# Patient Record
Sex: FEMALE | Race: WHITE | Employment: FULL TIME | URBAN - METROPOLITAN AREA
[De-identification: names, ages, dates, MRNs, and addresses within clinical notes are randomized per-mention and may not be internally consistent; named-entity substitution may affect disease eponyms.]

---

## 2018-06-19 PROBLEM — N31.9 NEUROGENIC BLADDER: Status: ACTIVE | Noted: 2018-06-19

## 2018-06-19 PROBLEM — E66.01 SEVERE OBESITY (BMI 35.0-39.9): Status: ACTIVE | Noted: 2018-06-19

## 2018-08-27 ENCOUNTER — HOSPITAL ENCOUNTER (OUTPATIENT)
Dept: MAMMOGRAPHY | Age: 48
Discharge: HOME | End: 2018-08-27
Attending: FAMILY MEDICINE
Payer: COMMERCIAL

## 2018-08-27 DIAGNOSIS — Z12.39 SCREENING FOR BREAST CANCER: ICD-10-CM

## 2018-08-27 PROCEDURE — 77067 SCR MAMMO BI INCL CAD: CPT

## 2018-10-03 ENCOUNTER — HOSPITAL ENCOUNTER (OUTPATIENT)
Dept: SURGERY | Age: 48
Discharge: HOME | End: 2018-10-03

## 2018-10-05 VITALS — BODY MASS INDEX: 39.65 KG/M2 | HEIGHT: 61 IN | WEIGHT: 210 LBS

## 2018-10-05 NOTE — PERIOP NOTES
Patient verified name and . Order for consent not found in EMR. Type 2 surgery, PAT phone assessment complete. Orders not received. Labs per surgeon: no orders  Labs per anesthesia protocol: Hgb- Pt instructed to come for lab work entrance B (Monday- Friday 8:30 AM- 4:00 PM) prior to surgery day. Pt voiced an understanding. No orders in EMR. Office is currently closed. Will have charge nurse follow up with office on Monday to notify of the need for orders in EMR due to faxed orders not being accepted as of 10/1/18. Patient answered medical/surgical history questions at their best of ability. All prior to admission medications documented in [facility]. Patient instructed to take the following medications the day of surgery according to anesthesia guidelines with a small sip of water: none. Hold all vitamins 7 days prior to surgery and NSAIDS 5 days prior to surgery. Medications to be held- none. Patient instructed on the following:  Arrive at A Entrance, time of arrival to be called the day before by 1700  NPO after midnight including gum, mints, and ice chips  Responsible adult must drive patient to the hospital, stay during surgery, and patient will  need supervision 24 hours after anesthesia  Use antibacterial soap in shower the night before surgery and on the morning of surgery  Leave all valuables (money and jewelry) at home but bring insurance card and ID on  DOS  Do not wear make-up, nail polish, lotions, cologne, perfumes, powders, or oil on skin. Patient teach back successful and patient demonstrates knowledge of instruction.

## 2018-10-08 ENCOUNTER — HOSPITAL ENCOUNTER (OUTPATIENT)
Dept: LAB | Age: 48
Discharge: HOME | End: 2018-10-08
Attending: PLASTIC SURGERY
Payer: COMMERCIAL

## 2018-10-08 LAB — HGB BLD-MCNC: 12.9 G/DL (ref 11.7–15.4)

## 2018-10-08 PROCEDURE — 36415 COLL VENOUS BLD VENIPUNCTURE: CPT

## 2018-10-08 PROCEDURE — 85018 HEMOGLOBIN: CPT

## 2018-10-08 NOTE — PERIOP NOTES
Lab results within limits per anesthesia protocol; OK for surgery. Recent Results (from the past 12 hour(s)) HEMOGLOBIN Collection Time: 10/08/18  2:40 PM  
Result Value Ref Range HGB 12.9 11.7 - 15.4 g/dL

## 2018-10-09 ENCOUNTER — ANESTHESIA EVENT (OUTPATIENT)
Dept: SURGERY | Age: 48
End: 2018-10-09
Payer: COMMERCIAL

## 2018-10-10 ENCOUNTER — HOSPITAL ENCOUNTER (OUTPATIENT)
Age: 48
Setting detail: OUTPATIENT SURGERY
Discharge: HOME | End: 2018-10-10
Attending: PLASTIC SURGERY | Admitting: PLASTIC SURGERY
Payer: COMMERCIAL

## 2018-10-10 ENCOUNTER — ANESTHESIA (OUTPATIENT)
Dept: SURGERY | Age: 48
End: 2018-10-10
Payer: COMMERCIAL

## 2018-10-10 VITALS
DIASTOLIC BLOOD PRESSURE: 67 MMHG | OXYGEN SATURATION: 91 % | HEART RATE: 125 BPM | TEMPERATURE: 98.2 F | WEIGHT: 212.8 LBS | SYSTOLIC BLOOD PRESSURE: 115 MMHG | BODY MASS INDEX: 40.21 KG/M2 | RESPIRATION RATE: 16 BRPM

## 2018-10-10 PROCEDURE — 77030002966 HC SUT PDS J&J -A: Performed by: PLASTIC SURGERY

## 2018-10-10 PROCEDURE — 74011000250 HC RX REV CODE- 250

## 2018-10-10 PROCEDURE — 76060000038 HC ANESTHESIA 3.5 TO 4 HR: Performed by: PLASTIC SURGERY

## 2018-10-10 PROCEDURE — 74011250636 HC RX REV CODE- 250/636

## 2018-10-10 PROCEDURE — 77030005537 HC CATH URETH BARD -A: Performed by: PLASTIC SURGERY

## 2018-10-10 PROCEDURE — 77030009845 HC ONTMNT BACITRCN PATT -A: Performed by: PLASTIC SURGERY

## 2018-10-10 PROCEDURE — 74011250636 HC RX REV CODE- 250/636: Performed by: ANESTHESIOLOGY

## 2018-10-10 PROCEDURE — 77030008467 HC STPLR SKN COVD -B: Performed by: PLASTIC SURGERY

## 2018-10-10 PROCEDURE — 77030018836 HC SOL IRR NACL ICUM -A: Performed by: PLASTIC SURGERY

## 2018-10-10 PROCEDURE — 88307 TISSUE EXAM BY PATHOLOGIST: CPT

## 2018-10-10 PROCEDURE — 74011250636 HC RX REV CODE- 250/636: Performed by: PLASTIC SURGERY

## 2018-10-10 PROCEDURE — 77030020782 HC GWN BAIR PAWS FLX 3M -B: Performed by: ANESTHESIOLOGY

## 2018-10-10 PROCEDURE — 77030008703 HC TU ET UNCUF COVD -A: Performed by: ANESTHESIOLOGY

## 2018-10-10 PROCEDURE — 77030008477 HC STYL SATN SLP COVD -A: Performed by: ANESTHESIOLOGY

## 2018-10-10 PROCEDURE — 74011000250 HC RX REV CODE- 250: Performed by: PLASTIC SURGERY

## 2018-10-10 PROCEDURE — 77030034849: Performed by: PLASTIC SURGERY

## 2018-10-10 PROCEDURE — 77030032490 HC SLV COMPR SCD KNE COVD -B: Performed by: PLASTIC SURGERY

## 2018-10-10 PROCEDURE — 76210000000 HC OR PH I REC 2 TO 2.5 HR: Performed by: PLASTIC SURGERY

## 2018-10-10 PROCEDURE — 77030031139 HC SUT VCRL2 J&J -A: Performed by: PLASTIC SURGERY

## 2018-10-10 PROCEDURE — 77030011825 HC SUPP SURG PSTOP S2SG -B: Performed by: PLASTIC SURGERY

## 2018-10-10 PROCEDURE — 76010000174 HC OR TIME 3.5 TO 4 HR INTENSV-TIER 1: Performed by: PLASTIC SURGERY

## 2018-10-10 PROCEDURE — 77030011266 HC ELECTRD BLD INSL COVD -A: Performed by: PLASTIC SURGERY

## 2018-10-10 PROCEDURE — 77030002933 HC SUT MCRYL J&J -A: Performed by: PLASTIC SURGERY

## 2018-10-10 PROCEDURE — 77030039425 HC BLD LARYNG TRULITE DISP TELE -A: Performed by: ANESTHESIOLOGY

## 2018-10-10 RX ORDER — FENTANYL CITRATE 50 UG/ML
INJECTION, SOLUTION INTRAMUSCULAR; INTRAVENOUS AS NEEDED
Status: DISCONTINUED | OUTPATIENT
Start: 2018-10-10 | End: 2018-10-10 | Stop reason: HOSPADM

## 2018-10-10 RX ORDER — KETOROLAC TROMETHAMINE 30 MG/ML
INJECTION, SOLUTION INTRAMUSCULAR; INTRAVENOUS AS NEEDED
Status: DISCONTINUED | OUTPATIENT
Start: 2018-10-10 | End: 2018-10-10 | Stop reason: HOSPADM

## 2018-10-10 RX ORDER — NEOSTIGMINE METHYLSULFATE 1 MG/ML
INJECTION INTRAVENOUS AS NEEDED
Status: DISCONTINUED | OUTPATIENT
Start: 2018-10-10 | End: 2018-10-10 | Stop reason: HOSPADM

## 2018-10-10 RX ORDER — ACETAMINOPHEN 10 MG/ML
INJECTION, SOLUTION INTRAVENOUS AS NEEDED
Status: DISCONTINUED | OUTPATIENT
Start: 2018-10-10 | End: 2018-10-10 | Stop reason: HOSPADM

## 2018-10-10 RX ORDER — DIPHENHYDRAMINE HYDROCHLORIDE 50 MG/ML
12.5 INJECTION, SOLUTION INTRAMUSCULAR; INTRAVENOUS
Status: DISCONTINUED | OUTPATIENT
Start: 2018-10-10 | End: 2018-10-10 | Stop reason: HOSPADM

## 2018-10-10 RX ORDER — LIDOCAINE HYDROCHLORIDE 20 MG/ML
INJECTION, SOLUTION EPIDURAL; INFILTRATION; INTRACAUDAL; PERINEURAL AS NEEDED
Status: DISCONTINUED | OUTPATIENT
Start: 2018-10-10 | End: 2018-10-10 | Stop reason: HOSPADM

## 2018-10-10 RX ORDER — FLUMAZENIL 0.1 MG/ML
0.2 INJECTION INTRAVENOUS
Status: DISCONTINUED | OUTPATIENT
Start: 2018-10-10 | End: 2018-10-10 | Stop reason: HOSPADM

## 2018-10-10 RX ORDER — GLYCOPYRROLATE 0.2 MG/ML
INJECTION INTRAMUSCULAR; INTRAVENOUS AS NEEDED
Status: DISCONTINUED | OUTPATIENT
Start: 2018-10-10 | End: 2018-10-10 | Stop reason: HOSPADM

## 2018-10-10 RX ORDER — SODIUM CHLORIDE 0.9 % (FLUSH) 0.9 %
5-10 SYRINGE (ML) INJECTION AS NEEDED
Status: DISCONTINUED | OUTPATIENT
Start: 2018-10-10 | End: 2018-10-10 | Stop reason: HOSPADM

## 2018-10-10 RX ORDER — EPHEDRINE SULFATE 50 MG/ML
INJECTION, SOLUTION INTRAVENOUS AS NEEDED
Status: DISCONTINUED | OUTPATIENT
Start: 2018-10-10 | End: 2018-10-10 | Stop reason: HOSPADM

## 2018-10-10 RX ORDER — MIDAZOLAM HYDROCHLORIDE 1 MG/ML
2 INJECTION, SOLUTION INTRAMUSCULAR; INTRAVENOUS
Status: COMPLETED | OUTPATIENT
Start: 2018-10-10 | End: 2018-10-10

## 2018-10-10 RX ORDER — ONDANSETRON 8 MG/1
8 TABLET, ORALLY DISINTEGRATING ORAL
COMMUNITY

## 2018-10-10 RX ORDER — DEXAMETHASONE SODIUM PHOSPHATE 4 MG/ML
INJECTION, SOLUTION INTRA-ARTICULAR; INTRALESIONAL; INTRAMUSCULAR; INTRAVENOUS; SOFT TISSUE AS NEEDED
Status: DISCONTINUED | OUTPATIENT
Start: 2018-10-10 | End: 2018-10-10 | Stop reason: HOSPADM

## 2018-10-10 RX ORDER — OXYCODONE HYDROCHLORIDE 5 MG/1
5 TABLET ORAL
Status: DISCONTINUED | OUTPATIENT
Start: 2018-10-10 | End: 2018-10-10 | Stop reason: HOSPADM

## 2018-10-10 RX ORDER — HYDROMORPHONE HYDROCHLORIDE 2 MG/ML
0.5 INJECTION, SOLUTION INTRAMUSCULAR; INTRAVENOUS; SUBCUTANEOUS
Status: DISCONTINUED | OUTPATIENT
Start: 2018-10-10 | End: 2018-10-10 | Stop reason: HOSPADM

## 2018-10-10 RX ORDER — DIPHENHYDRAMINE HYDROCHLORIDE 50 MG/ML
INJECTION, SOLUTION INTRAMUSCULAR; INTRAVENOUS AS NEEDED
Status: DISCONTINUED | OUTPATIENT
Start: 2018-10-10 | End: 2018-10-10 | Stop reason: HOSPADM

## 2018-10-10 RX ORDER — HYDROMORPHONE HYDROCHLORIDE 2 MG/ML
INJECTION, SOLUTION INTRAMUSCULAR; INTRAVENOUS; SUBCUTANEOUS AS NEEDED
Status: DISCONTINUED | OUTPATIENT
Start: 2018-10-10 | End: 2018-10-10 | Stop reason: HOSPADM

## 2018-10-10 RX ORDER — CEFAZOLIN SODIUM/WATER 2 G/20 ML
2 SYRINGE (ML) INTRAVENOUS ONCE
Status: COMPLETED | OUTPATIENT
Start: 2018-10-10 | End: 2018-10-10

## 2018-10-10 RX ORDER — SODIUM CHLORIDE 0.9 % (FLUSH) 0.9 %
5-10 SYRINGE (ML) INJECTION EVERY 8 HOURS
Status: DISCONTINUED | OUTPATIENT
Start: 2018-10-10 | End: 2018-10-10 | Stop reason: HOSPADM

## 2018-10-10 RX ORDER — LIDOCAINE HYDROCHLORIDE 10 MG/ML
0.1 INJECTION INFILTRATION; PERINEURAL AS NEEDED
Status: DISCONTINUED | OUTPATIENT
Start: 2018-10-10 | End: 2018-10-10 | Stop reason: HOSPADM

## 2018-10-10 RX ORDER — PROPOFOL 10 MG/ML
INJECTION, EMULSION INTRAVENOUS AS NEEDED
Status: DISCONTINUED | OUTPATIENT
Start: 2018-10-10 | End: 2018-10-10 | Stop reason: HOSPADM

## 2018-10-10 RX ORDER — HEPARIN SODIUM 5000 [USP'U]/ML
5000 INJECTION, SOLUTION INTRAVENOUS; SUBCUTANEOUS ONCE
Status: COMPLETED | OUTPATIENT
Start: 2018-10-10 | End: 2018-10-10

## 2018-10-10 RX ORDER — BUPIVACAINE HYDROCHLORIDE 5 MG/ML
INJECTION, SOLUTION EPIDURAL; INTRACAUDAL AS NEEDED
Status: DISCONTINUED | OUTPATIENT
Start: 2018-10-10 | End: 2018-10-10 | Stop reason: HOSPADM

## 2018-10-10 RX ORDER — SODIUM CHLORIDE, SODIUM LACTATE, POTASSIUM CHLORIDE, CALCIUM CHLORIDE 600; 310; 30; 20 MG/100ML; MG/100ML; MG/100ML; MG/100ML
100 INJECTION, SOLUTION INTRAVENOUS CONTINUOUS
Status: DISCONTINUED | OUTPATIENT
Start: 2018-10-10 | End: 2018-10-10 | Stop reason: HOSPADM

## 2018-10-10 RX ORDER — SODIUM CHLORIDE, SODIUM LACTATE, POTASSIUM CHLORIDE, CALCIUM CHLORIDE 600; 310; 30; 20 MG/100ML; MG/100ML; MG/100ML; MG/100ML
INJECTION, SOLUTION INTRAVENOUS
Status: DISCONTINUED | OUTPATIENT
Start: 2018-10-10 | End: 2018-10-10 | Stop reason: HOSPADM

## 2018-10-10 RX ORDER — ROCURONIUM BROMIDE 10 MG/ML
INJECTION, SOLUTION INTRAVENOUS AS NEEDED
Status: DISCONTINUED | OUTPATIENT
Start: 2018-10-10 | End: 2018-10-10 | Stop reason: HOSPADM

## 2018-10-10 RX ORDER — SCOLOPAMINE TRANSDERMAL SYSTEM 1 MG/1
1 PATCH, EXTENDED RELEASE TRANSDERMAL
COMMUNITY
End: 2018-11-26

## 2018-10-10 RX ORDER — NALOXONE HYDROCHLORIDE 0.4 MG/ML
0.1 INJECTION, SOLUTION INTRAMUSCULAR; INTRAVENOUS; SUBCUTANEOUS
Status: DISCONTINUED | OUTPATIENT
Start: 2018-10-10 | End: 2018-10-10 | Stop reason: HOSPADM

## 2018-10-10 RX ORDER — SODIUM CHLORIDE, SODIUM LACTATE, POTASSIUM CHLORIDE, CALCIUM CHLORIDE 600; 310; 30; 20 MG/100ML; MG/100ML; MG/100ML; MG/100ML
75 INJECTION, SOLUTION INTRAVENOUS CONTINUOUS
Status: DISCONTINUED | OUTPATIENT
Start: 2018-10-10 | End: 2018-10-10 | Stop reason: HOSPADM

## 2018-10-10 RX ORDER — ONDANSETRON 2 MG/ML
INJECTION INTRAMUSCULAR; INTRAVENOUS AS NEEDED
Status: DISCONTINUED | OUTPATIENT
Start: 2018-10-10 | End: 2018-10-10 | Stop reason: HOSPADM

## 2018-10-10 RX ADMIN — ACETAMINOPHEN 1000 MG: 10 INJECTION, SOLUTION INTRAVENOUS at 12:41

## 2018-10-10 RX ADMIN — SODIUM CHLORIDE, SODIUM LACTATE, POTASSIUM CHLORIDE, AND CALCIUM CHLORIDE 100 ML/HR: 600; 310; 30; 20 INJECTION, SOLUTION INTRAVENOUS at 12:04

## 2018-10-10 RX ADMIN — HYDROMORPHONE HYDROCHLORIDE 0.5 MG: 2 INJECTION, SOLUTION INTRAMUSCULAR; INTRAVENOUS; SUBCUTANEOUS at 16:40

## 2018-10-10 RX ADMIN — ROCURONIUM BROMIDE 10 MG: 10 INJECTION, SOLUTION INTRAVENOUS at 13:42

## 2018-10-10 RX ADMIN — FENTANYL CITRATE 50 MCG: 50 INJECTION, SOLUTION INTRAMUSCULAR; INTRAVENOUS at 12:26

## 2018-10-10 RX ADMIN — DIPHENHYDRAMINE HYDROCHLORIDE 25 MG: 50 INJECTION, SOLUTION INTRAMUSCULAR; INTRAVENOUS at 12:37

## 2018-10-10 RX ADMIN — EPHEDRINE SULFATE 10 MG: 50 INJECTION, SOLUTION INTRAVENOUS at 14:51

## 2018-10-10 RX ADMIN — HYDROMORPHONE HYDROCHLORIDE 0.5 MG: 2 INJECTION, SOLUTION INTRAMUSCULAR; INTRAVENOUS; SUBCUTANEOUS at 15:55

## 2018-10-10 RX ADMIN — SODIUM CHLORIDE, SODIUM LACTATE, POTASSIUM CHLORIDE, CALCIUM CHLORIDE: 600; 310; 30; 20 INJECTION, SOLUTION INTRAVENOUS at 14:15

## 2018-10-10 RX ADMIN — EPHEDRINE SULFATE 10 MG: 50 INJECTION, SOLUTION INTRAVENOUS at 13:19

## 2018-10-10 RX ADMIN — HEPARIN SODIUM 5000 UNITS: 5000 INJECTION INTRAVENOUS; SUBCUTANEOUS at 12:04

## 2018-10-10 RX ADMIN — KETOROLAC TROMETHAMINE 30 MG: 30 INJECTION, SOLUTION INTRAMUSCULAR; INTRAVENOUS at 15:29

## 2018-10-10 RX ADMIN — EPHEDRINE SULFATE 10 MG: 50 INJECTION, SOLUTION INTRAVENOUS at 14:16

## 2018-10-10 RX ADMIN — PROPOFOL 200 MG: 10 INJECTION, EMULSION INTRAVENOUS at 12:20

## 2018-10-10 RX ADMIN — NEOSTIGMINE METHYLSULFATE 3 MG: 1 INJECTION INTRAVENOUS at 15:33

## 2018-10-10 RX ADMIN — ONDANSETRON 4 MG: 2 INJECTION INTRAMUSCULAR; INTRAVENOUS at 14:53

## 2018-10-10 RX ADMIN — GLYCOPYRROLATE 0.4 MG: 0.2 INJECTION INTRAMUSCULAR; INTRAVENOUS at 15:33

## 2018-10-10 RX ADMIN — FENTANYL CITRATE 50 MCG: 50 INJECTION, SOLUTION INTRAMUSCULAR; INTRAVENOUS at 12:20

## 2018-10-10 RX ADMIN — LIDOCAINE HYDROCHLORIDE 60 MG: 20 INJECTION, SOLUTION EPIDURAL; INFILTRATION; INTRACAUDAL; PERINEURAL at 12:20

## 2018-10-10 RX ADMIN — FENTANYL CITRATE 25 MCG: 50 INJECTION, SOLUTION INTRAMUSCULAR; INTRAVENOUS at 14:38

## 2018-10-10 RX ADMIN — DEXAMETHASONE SODIUM PHOSPHATE 10 MG: 4 INJECTION, SOLUTION INTRA-ARTICULAR; INTRALESIONAL; INTRAMUSCULAR; INTRAVENOUS; SOFT TISSUE at 12:34

## 2018-10-10 RX ADMIN — SODIUM CHLORIDE, SODIUM LACTATE, POTASSIUM CHLORIDE, AND CALCIUM CHLORIDE: 600; 310; 30; 20 INJECTION, SOLUTION INTRAVENOUS at 12:12

## 2018-10-10 RX ADMIN — ROCURONIUM BROMIDE 10 MG: 10 INJECTION, SOLUTION INTRAVENOUS at 14:31

## 2018-10-10 RX ADMIN — SODIUM CHLORIDE, SODIUM LACTATE, POTASSIUM CHLORIDE, CALCIUM CHLORIDE: 600; 310; 30; 20 INJECTION, SOLUTION INTRAVENOUS at 12:51

## 2018-10-10 RX ADMIN — FENTANYL CITRATE 50 MCG: 50 INJECTION, SOLUTION INTRAMUSCULAR; INTRAVENOUS at 13:10

## 2018-10-10 RX ADMIN — ROCURONIUM BROMIDE 40 MG: 10 INJECTION, SOLUTION INTRAVENOUS at 12:20

## 2018-10-10 RX ADMIN — SODIUM CHLORIDE, SODIUM LACTATE, POTASSIUM CHLORIDE, AND CALCIUM CHLORIDE 100 ML/HR: 600; 310; 30; 20 INJECTION, SOLUTION INTRAVENOUS at 11:00

## 2018-10-10 RX ADMIN — HYDROMORPHONE HYDROCHLORIDE 0.5 MG: 2 INJECTION, SOLUTION INTRAMUSCULAR; INTRAVENOUS; SUBCUTANEOUS at 16:45

## 2018-10-10 RX ADMIN — SODIUM CHLORIDE, SODIUM LACTATE, POTASSIUM CHLORIDE, CALCIUM CHLORIDE: 600; 310; 30; 20 INJECTION, SOLUTION INTRAVENOUS at 12:12

## 2018-10-10 RX ADMIN — MIDAZOLAM HYDROCHLORIDE 2 MG: 1 INJECTION, SOLUTION INTRAMUSCULAR; INTRAVENOUS at 12:05

## 2018-10-10 RX ADMIN — Medication 2 G: at 12:24

## 2018-10-10 RX ADMIN — FENTANYL CITRATE 25 MCG: 50 INJECTION, SOLUTION INTRAMUSCULAR; INTRAVENOUS at 15:20

## 2018-10-10 NOTE — BRIEF OP NOTE
BRIEF OPERATIVE NOTE Date of Procedure: 10/10/2018 Preoperative Diagnosis: Breast hypertrophy [N62] Breast pain [N64.4] Acute right-sided low back pain with sciatica, sciatica laterality unspecified [M54.40] Disorder of neck [M53.82] Intertrigo [L30.4] Ptosis of breast [N64.81] Postoperative Diagnosis: Breast hypertrophy [N62] Breast pain [N64.4] Acute right-sided low back pain with sciatica, sciatica laterality unspecified [M54.40] Disorder of neck [M53.82] Intertrigo [L30.4] Ptosis of breast [N64.81] Procedure(s): BILATERAL BREAST REDUCTION Surgeon(s) and Role: Meagan Carrillo MD - Primary Surgical Assistant: Maris Meckel, cst 
 
Surgical Staff: 
Circ-1: Radhames Harrell RN 
Circ-2: Raimundo Sprague RN Scrub Tech-1: Kim Coyne Scrub Tech-2: Rene Mckeon Scrub RN-Relief: Terry Lan RN Event Time In Incision Start [phone] Incision Close 1535 Anesthesia: General  
Estimated Blood Loss: 125 ml Specimens:  
ID Type Source Tests Collected by Time Destination 1 : right breast    Joy Bone III, MD 10/10/2018 1410 Pathology 2 : left breast    Joy Bone III, MD 10/10/2018 1321 Pathology Findings: bilateral macromastia Complications: none Implants: * No implants in log *

## 2018-10-10 NOTE — DISCHARGE INSTRUCTIONS
Leave dressings on until follow up  Call office for follow up appt.  This Friday [phone]  Regular diet  No lifting > 5lbs  Start antibiotic tonight and take as directed until gone        Instructions Following Ambulatory Surgery    Activity  · As tolerated and directed by your doctor  · Bathe or shower as directed by your doctor    Diet  · Clear liquids until no nausea or vomiting; then light diet for the first day  · Advance to regular diet on second day, unless your doctor orders otherwise  · If nausea and vomiting continues, call your doctor    Pain  · Take pain medication as directed by your doctor  ·  Call your doctor if pain is NOT relieved by medication  · DO NOT take aspirin or blood thinners until directed by your doctor    Follow-Up Phone Calls  · Will be made nursing staff  · If you have any problems, call your doctor as needed    Call your doctor if  · Excessive bleeding that does not stop after holding mild pressure over the area  · Temperature of 101 degrees F or above  · Redness,excessive swelling or bruising, and/or green or yellow, smelly discharge from incision    After Anesthesia  · For the first 24 hours: DO NOT Drive, Drink alcoholic beverages, or Make important decisions  · Be aware of dizziness following anesthesia and while taking pain medication

## 2018-10-10 NOTE — IP AVS SNAPSHOT
[address] [place] [address] 
 
 
 [facility] [id number] 
[phone] Patient: Kaykay Escalera MRN: [id number] Earleen Ormond About your hospitalization You were admitted on:  October 10, 2018 You last received care in the:  [facility] PACU You were discharged on:  October 10, 2018 Why you were hospitalized Your primary diagnosis was:  Not on File Follow-up Information Follow up With Details Comments Contact Info Rabia Loya MD   [facility] [address] 
[phone] Germaine Kay MD Schedule an appointment as soon as possible for a visit on 10/12/2018  [address] [place] [address] 
[phone] Your Scheduled Appointments Monday November 26, 2018  2:00 PM EST Office Visit with Rabia Loya MD  
[facility] ([address] [facility]) [facility] [place] [address] [phone] Bring all medications, insurance and prescription cards and please be prepared to pay any copay that may be due. Discharge Orders None A check wang indicates which time of day the medication should be taken. My Medications CHANGE how you take these medications Instructions Each Dose to Equal  
 Morning Noon Evening Bedtime DULoxetine 60 mg capsule Commonly known as:  CYMBALTA What changed:   
- how much to take 
- how to take this - when to take this 
- additional instructions Your last dose was: Your next dose is: One tab po every day CONTINUE taking these medications Instructions Each Dose to Equal  
 Morning Noon Evening Bedtime Catheter 14-16 Fr-\" Misc Your last dose was: Your next dose is:    
   
   
 Strength: ; Form: 14 [place], 6 inch Straight Tip w/o connector, Female; SIG: take as directed  4x daily oxybutynin 5 mg tablet Commonly known as:  IHEXGSKC Your last dose was: Your next dose is: Take 5 mg by mouth daily as needed. 5 mg  
    
   
   
   
  
 phenazopyridine 200 mg tablet Commonly known as:  PYRIDIUM Your last dose was: Your next dose is: Take 200 mg by mouth three (3) times daily as needed. 200 mg  
    
   
   
   
  
 scopolamine 1 mg over 3 days Pt3d Commonly known as:  TRANSDERM-SCOP Your last dose was: Your next dose is:    
   
   
 1 Patch by TransDERmal route every seventy-two (72) hours. Indications: PREVENTION OF POST-OPERATIVE NAUSEA AND VOMITING  
 1 Patch ZOFRAN ODT 8 mg disintegrating tablet Generic drug:  ondansetron Your last dose was: Your next dose is: Take 8 mg by mouth every eight (8) hours as needed for Nausea. 8 mg Discharge Instructions Leave dressings on until follow up Call office for follow up appt. This Friday [phone] Regular diet No lifting > 5lbs Start antibiotic tonight and take as directed until gone Instructions Following Ambulatory Surgery Activity · As tolerated and directed by your doctor · Bathe or shower as directed by your doctor Diet · Clear liquids until no nausea or vomiting; then light diet for the first day · Advance to regular diet on second day, unless your doctor orders otherwise · If nausea and vomiting continues, call your doctor Pain · Take pain medication as directed by your doctor ·  Call your doctor if pain is NOT relieved by medication · DO NOT take aspirin or blood thinners until directed by your doctor Follow-Up Phone Calls · Will be made nursing staff · If you have any problems, call your doctor as needed Call your doctor if 
· Excessive bleeding that does not stop after holding mild pressure over the area · Temperature of 101 degrees F or above · Redness,excessive swelling or bruising, and/or green or yellow, smelly discharge from incision After Anesthesia · For the first 24 hours: DO NOT Drive, Drink alcoholic beverages, or Make important decisions · Be aware of dizziness following anesthesia and while taking pain medication Introducing [facility]! [facility] introduces [employer] patient portal. Now you can access parts of your medical record, email your doctor's office, and request medication refills online. 1. In your internet browser, go to [email or web address]://Project [email or web address]. [email or web address]/Project 2020 2. Click on the First Time User? Click Here link in the Sign In box. You will see the New Member Sign Up page. 3. Enter your [employer] Access Code exactly as it appears below. You will not need to use this code after youve completed the sign-up process. If you do not sign up before the expiration date, you must request a new code. · [employer] Access Code: [email or web address] Expires: 1/6/2019  2:34 PM 
 
4. Enter the last four digits of your Social Security Number (xxxx) and Date of Birth (mm/dd/yyyy) as indicated and click Submit. You will be taken to the next sign-up page. 5. Create a [employer] ID. This will be your [employer] login ID and cannot be changed, so think of one that is secure and easy to remember. 6. Create a [employer] password. You can change your password at any time. 7. Enter your Password Reset Question and Answer. This can be used at a later time if you forget your password. 8. Enter your e-mail address. You will receive e-mail notification when new information is available in [address] 9. Click Sign Up. You can now view and download portions of your medical record. 10. Click the Download Summary menu link to download a portable copy of your medical information.  
 
If you have questions, please visit the Frequently Asked Questions section of the [employer]. Remember, MyChart is NOT to be used for urgent needs. For medical emergencies, dial 911. Now available from your iPhone and Android! Introducing [employer] As a [facility] patient, I wanted to make you aware of our electronic visit tool called [employer]. [employer] allows you to connect within minutes with a medical provider 24 hours a day, seven days a week via a mobile device or tablet or logging into a secure website from your computer. You can access [employer]vers from anywhere in the [place]. A virtual visit might be right for you when you have a simple condition and feel like you just dont want to get out of bed, or cant get away from work for an appointment, when your regular [facility] provider is not available (evenings, weekends or holidays), or when youre out of town and need minor care. Electronic visits cost only $49 and if the [employer] provider determines a prescription is needed to treat your condition, one can be electronically transmitted to a nearby pharmacy*. Please take a moment to enroll today if you have not already done so. The enrollment process is free and takes just a few minutes. To enroll, please download the Lyt[employer] gurjit to your tablet or phone, or visit [email or web address] to enroll on your computer. And, as an [address] [place] [address] patient with a [employer] account, the results of your visits will be scanned into your electronic medical record and your primary care provider will be able to view the scanned results. We urge you to continue to see your regular [facility] provider for your ongoing medical care. And while your primary care provider may not be the one available when you seek a [employer] virtual visit, the peace of mind you get from getting a real diagnosis real time can be priceless. For more information on [employer], view our Frequently Asked Questions (FAQs) at [email or web address]. Sincerely, 
 
Esha Cruz MD 
Chief Medical Officer Rose[address] Kenia [place] *:  certain medications cannot be prescribed via [employer] Providers Seen During Your Hospitalization Provider Specialty Primary office phone 99 Adriana Blas MD Plastic Surgery [phone] Your Primary Care Physician (PCP) Primary Care Physician Office Phone Office Fax Terell Tamiko [phone] You are allergic to the following Allergen Reactions Amoxicillin Diarrhea Recent Documentation Weight BMI OB Status Smoking Status 96.5 kg 40.21 kg/m2 Premenopausal Former Smoker Emergency Contacts Name Discharge Info Relation Home Work Mobile Emma Mcmanus DISCHARGE CAREGIVER [3] Spouse [3] [phone] Patient Belongings The following personal items are in your possession at time of discharge: 
  Dental Appliances: None Please provide this summary of care documentation to your next provider. Signatures-by signing, you are acknowledging that this After Visit Summary has been reviewed with you and you have received a copy. Patient Signature:  ____________________________________________________________ Date:  ____________________________________________________________  
  
Seth Snmarylu Provider Signature:  ____________________________________________________________ Date:  ____________________________________________________________

## 2018-10-10 NOTE — ANESTHESIA PREPROCEDURE EVALUATION
Anesthetic History PONV Review of Systems / Medical History Patient summary reviewed and pertinent labs reviewed Pulmonary Within defined limits Neuro/Psych Psychiatric history Cardiovascular Within defined limits Exercise tolerance: >4 METS 
  
GI/Hepatic/Renal 
Within defined limits Endo/Other Obesity Other Findings Comments: Neurogenic bladder - self cath Physical Exam 
 
Airway Mallampati: II 
TM Distance: 4 - 6 cm Neck ROM: normal range of motion Mouth opening: Normal 
 
 Cardiovascular Rhythm: regular Rate: normal 
 
 
 
 Dental 
No notable dental hx Pulmonary Breath sounds clear to auscultation Abdominal 
GI exam deferred Other Findings Anesthetic Plan ASA: 2 Anesthesia type: general 
 
 
 
 
Induction: Intravenous Anesthetic plan and risks discussed with: Patient

## 2018-10-10 NOTE — ANESTHESIA POSTPROCEDURE EVALUATION
Post-Anesthesia Evaluation and Assessment Patient: Ana Flores MRN: [id number]  SSN: [id number] YOB: 1970  Age: 51 y.o. Sex: female Cardiovascular Function/Vital Signs Visit Vitals  /71 (BP 1 Location: Right arm, BP Patient Position: At rest)  Pulse (!) 116  Temp 36.8 °C (98.2 °F)  Resp 14  Wt 96.5 kg (212 lb 12.8 oz)  SpO2 92%  BMI 40.21 kg/m2 Patient is status post general anesthesia for Procedure(s): BILATERAL BREAST REDUCTION. Nausea/Vomiting: None Postoperative hydration reviewed and adequate. Pain: 
Pain Scale 1: Visual (10/10/18 1704) Pain Intensity 1: 0 (10/10/18 1704) Managed Neurological Status:  
Neuro (WDL): Exceptions to WDL (10/10/18 1704) Neuro Neurologic State: Drowsy; Eyes open to voice (10/10/18 1704) Cognition: Follows commands (10/10/18 1704) LUE Motor Response: Purposeful (10/10/18 1704) LLE Motor Response: Purposeful (10/10/18 1704) RUE Motor Response: Purposeful (10/10/18 1704) RLE Motor Response: Purposeful (10/10/18 1704) At baseline Mental Status and Level of Consciousness: Arousable Pulmonary Status:  
O2 Device: Room air (10/10/18 1704) Adequate oxygenation and airway patent Complications related to anesthesia: None Post-anesthesia assessment completed. No concerns Signed By: Kvng King MD   
 October 10, 2018

## 2018-10-11 NOTE — OP NOTES
[address] [facility]  OPERATIVE REPORT    Alirio Michael  MR#: [id number]  : 1970  ACCOUNT #: [de-identified]   DATE OF SERVICE: 10/10/2018    PREOPERATIVE DIAGNOSIS:  Bilateral symptomatic macromastia. POSTOPERATIVE DIAGNOSIS:  Bilateral symptomatic macromastia. PROCEDURE PERFORMED:  Bilateral reduction mammoplasty (right breast 1140 grams, left breast 1212 grams). SURGEON:  Gracie Shelley MD    ASSISTANT:  JACI Chauhan    ANESTHESIA:  General.    ESTIMATED BLOOD LOSS:  125 mL. SPECIMENS REMOVED:  none    FLUIDS AT OPERATION:  2000 mL of crystalloid. CULTURES:  None. DRAINS:  None. COMPLICATIONS:  None. IMPLANTS:  none    CONDITION AT CLOSE OF PROCEDURE:  Stable. INDICATIONS:  The patient presented to the office with long-standing symptomatic macromastia. After reviewing the risks and benefits of the proposed surgery, she has elected to proceed with a reduction mammoplasty. She states she understands the risks of the procedure to include but not be limited to bleeding, infection, scars, asymmetry, poor cosmetic result, over-resection, under-resection failure to alleviate her symptoms, loss of nipple sensation, loss of part or all of the nipple-areolar complex, native skin necrosis, fat necrosis resulting in firm areas, hematoma or seroma formation, deep venous thrombosis, pulmonary emboli, the need for transfusion, need for hospitalization and the remote risk of death. Understanding these issues, she wished to proceed. DESCRIPTION OF PROCEDURE:  The patient was met in the preoperative area where in the sitting position, the midline was marked from the sternal notch to the umbilicus. The inframammary folds were marked bilaterally and the level of the fold was transposed onto the anterior aspect of the breast in the meridian. A standard Wise pattern reduction was then designed with a 7 cm wide pedicle and reducing the areola to 38 mm.   The patient was then taken to the operating room, placed in the supine position on a well-padded operating room table. Sequential compression devices were placed on her lower extremities. A pillow was placed behind her knees, her heels were padded. Her arms were secured to well-padded arm boards. Patient received IV antibiotics and 5000 units of heparin subcutaneously. After adequate induction of general anesthesia, the chest was then prepped and draped in the usual sterile fashion. The pedicle was initially de-epithelialized and then developed circumferentially with the electrocautery, taking care not to undermine. The medial, lateral and superior resections were then performed. The superior flap was elevated at the level of the pectoralis major muscle to the clavicle. The flap was then thinned aggressively and the skin was temporarily tacked with surgical staples. The 38 mm cookie cutter was centered over the apex of the vertical limb and the corresponding disk of tissue was removed. The nipple-areolar complexes were brought through and noted to be adequately vascularized. At this point, the staples were removed and hemostasis was assured. The area was irrigated with sterile saline. The skin was again temporarily closed with surgical staples. The areola was then inset with 4-0 Vicryl followed by 4-0 Monocryl. The vertical and inframammary limbs were then closed with running 3-0 PDS in the deep dermis followed by a 4-0 Monocryl subcuticular suture. The skin was infiltrated with 0.5% Marcaine plain. Please note, some 2-0 Vicryl sutures were placed in the deep subcutaneous tissues laterally in the mid axillary area prior to the full closure. Antibiotic ointment, Xeroform gauze, dry sterile gauze and ABD pad and a bra were applied. The patient was catheterized because of her urinary retention issue and 400 mL of clear yellow urine was obtained.   The patient was then awakened, extubated and transferred to the recovery room in stable condition.       Lana Goldberg, III, MD Jenene Lamas / CT  D: 10/10/2018 16:05     T: 10/11/2018 10:18  JOB #: [id number]

## 2019-08-21 PROBLEM — E66.01 CLASS 3 SEVERE OBESITY DUE TO EXCESS CALORIES WITHOUT SERIOUS COMORBIDITY WITH BODY MASS INDEX (BMI) OF 40.0 TO 44.9 IN ADULT (HCC): Status: ACTIVE | Noted: 2019-08-21

## 2022-03-19 PROBLEM — E66.813 CLASS 3 SEVERE OBESITY DUE TO EXCESS CALORIES WITHOUT SERIOUS COMORBIDITY WITH BODY MASS INDEX (BMI) OF 40.0 TO 44.9 IN ADULT: Status: ACTIVE | Noted: 2019-08-21

## 2022-03-19 PROBLEM — E66.01 CLASS 3 SEVERE OBESITY DUE TO EXCESS CALORIES WITHOUT SERIOUS COMORBIDITY WITH BODY MASS INDEX (BMI) OF 40.0 TO 44.9 IN ADULT (HCC): Status: ACTIVE | Noted: 2019-08-21

## 2022-03-19 PROBLEM — N31.9 NEUROGENIC BLADDER: Status: ACTIVE | Noted: 2018-06-19

## 2022-04-08 ENCOUNTER — HOSPITAL ENCOUNTER (OUTPATIENT)
Dept: MAMMOGRAPHY | Age: 52
Discharge: HOME | End: 2022-04-08
Attending: FAMILY MEDICINE
Payer: COMMERCIAL

## 2022-04-08 DIAGNOSIS — Z12.31 ENCOUNTER FOR SCREENING MAMMOGRAM FOR MALIGNANT NEOPLASM OF BREAST: ICD-10-CM

## 2022-04-08 PROCEDURE — 77067 SCR MAMMO BI INCL CAD: CPT

## 2022-07-06 ENCOUNTER — OFFICE VISIT (OUTPATIENT)
Dept: FAMILY MEDICINE CLINIC | Facility: CLINIC | Age: 52
End: 2022-07-06
Payer: COMMERCIAL

## 2022-07-06 VITALS
HEART RATE: 92 BPM | OXYGEN SATURATION: 97 % | RESPIRATION RATE: 18 BRPM | TEMPERATURE: 98.6 F | BODY MASS INDEX: 38.82 KG/M2 | WEIGHT: 205.6 LBS | DIASTOLIC BLOOD PRESSURE: 87 MMHG | SYSTOLIC BLOOD PRESSURE: 137 MMHG | HEIGHT: 61 IN

## 2022-07-06 DIAGNOSIS — F41.9 ANXIETY: ICD-10-CM

## 2022-07-06 DIAGNOSIS — E66.09 CLASS 2 OBESITY DUE TO EXCESS CALORIES WITHOUT SERIOUS COMORBIDITY WITH BODY MASS INDEX (BMI) OF 38.0 TO 38.9 IN ADULT: Primary | ICD-10-CM

## 2022-07-06 PROCEDURE — 99214 OFFICE O/P EST MOD 30 MIN: CPT | Performed by: FAMILY MEDICINE

## 2022-07-06 RX ORDER — SERTRALINE HYDROCHLORIDE 25 MG/1
25 TABLET, FILM COATED ORAL DAILY
Qty: 90 TABLET | Refills: 3 | Status: SHIPPED | OUTPATIENT
Start: 2022-07-06

## 2022-07-06 RX ORDER — BUPROPION HYDROCHLORIDE 150 MG/1
TABLET ORAL
Qty: 270 TABLET | Refills: 3 | Status: SHIPPED | OUTPATIENT
Start: 2022-07-06

## 2022-07-06 RX ORDER — SEMAGLUTIDE 0.5 MG/.5ML
0.5 INJECTION, SOLUTION SUBCUTANEOUS
Qty: 4 ML | Refills: 11 | Status: SHIPPED | OUTPATIENT
Start: 2022-07-06 | End: 2022-07-27

## 2022-07-06 NOTE — PATIENT INSTRUCTIONS
Patient Education        Learning About the Mediterranean Diet  What is the [address]? The Mediterranean diet is a style of eating rather than a diet plan. It features foods eaten in [place], [place], [place] and [place], and other countries along the [facility]. It emphasizes eating foods like fish, fruits, vegetables, beans, high-fiber breads and whole grains, nuts, and oliveoil. This style of eating includes limited red meat, cheese, and sweets. Why choose the Mediterranean diet? A Mediterranean-style diet may improve heart health. It contains more fat than other heart-healthy diets. But the fats are mainly from nuts, unsaturated oils (such as fish oils and olive oil), and certain nut or seed oils (such as canola, soybean, or flaxseed oil). These fats may help protect the heart andblood vessels. How can you get started on the Mediterranean diet? Here are some things you can do to switch to a more Mediterranean way of eating. What to eat   Eat a variety of fruits and vegetables each day, such as grapes, blueberries, tomatoes, broccoli, peppers, figs, olives, spinach, eggplant, beans, lentils, and chickpeas.  Eat a variety of whole-grain foods each day, such as oats, brown rice, and whole wheat bread, pasta, and couscous.  Eat fish at least 2 times a week. Try tuna, salmon, mackerel, lake trout, herring, or sardines.  Eat moderate amounts of low-fat dairy products, such as milk, cheese, or yogurt.  Eat moderate amounts of poultry and eggs.  Choose healthy (unsaturated) fats, such as nuts, olive oil, and certain nut or seed oils like canola, soybean, and flaxseed.  Limit unhealthy (saturated) fats, such as butter, palm oil, and coconut oil. And limit fats found in animal products, such as meat and dairy products made with whole milk. Try to eat red meat only a few times a month in very small amounts.  Limit sweets and desserts to only a few times a week.  This includes sugar-sweetened drinks like soda. The Mediterranean diet may also include red wine with your meal--1 glass eachday for women and up to 2 glasses a day for men. Tips for eating at home   Use herbs, spices, garlic, lemon zest, and citrus juice instead of salt to add flavor to foods.  Add avocado slices to your sandwich instead of mack.  Have fish for lunch or dinner instead of red meat. Brush the fish with olive oil, and broil or grill it.  Sprinkle your salad with seeds or nuts instead of cheese. Brittnee Moose with olive or canola oil instead of butter or oils that are high in saturated fat.  Switch from 2% milk or whole milk to 1% or fat-free milk.  Dip raw vegetables in a vinaigrette dressing or hummus instead of dips made from mayonnaise or sour cream.   Have a piece of fruit for dessert instead of a piece of cake. Try baked apples, or have some dried fruit. Tips for eating out   Try broiled, grilled, baked, or poached fish instead of having it fried or breaded.  Ask your  to have your meals prepared with olive oil instead of butter.  Order dishes made with marinara sauce or sauces made from olive oil. Avoid sauces made from cream or mayonnaise.  Choose whole-grain breads, whole wheat pasta and pizza crust, brown rice, beans, and lentils.  Cut back on butter or margarine on bread. Instead, you can dip your bread in a small amount of olive oil.  Ask for a side salad or grilled vegetables instead of french fries or chips. Where can you learn more? Go to https://[employer]josiaheweb.[email or web address] org and sign in to your nivio account. Enter [phone] in the [facility] box to learn more about \"Learning About the Mediterranean Diet. \"     If you do not have an account, please click on the \"Sign Up Now\" link. Current as of: September 8, 2021               Content Version: 13.3  © 8632-0804 Healthwise, Incorporated. Care instructions adapted under license by [facility] ([facility]).  If you have questions about a medical condition or this instruction, always ask your healthcare professional. [place] [address] any warranty or liability for your use of this information.

## 2022-07-06 NOTE — PROGRESS NOTES
[address] [facility] [address]  Phone: [phone] Fax [phone]  Allyn Rodriguez MD  2022           Ms. Pieter Currie  is a 45y.o.  year old  female patient who comes in to follow-up on issues related to anxiety and depression. She is taking Wellbutrin  mg 3 tablets daily as well as Zoloft 25 mg daily and it is really helping her. She is much less anxious. She has also here to follow-up about her weight. She tried Plenity but she has not been taking it regularly because it is difficult to remember to take. She is wondering what else she can try. No thoughts of suicide. .             Ms. Pieter Currie  has  has a past medical history of Anxiety, Inverted nipple, Menopause, Nausea & vomiting, and Self-catheterizes urinary bladder. Ms. Pieter Currie  has  has a past surgical history that includes Breast reduction surgery (10/12/2018); gyn; gyn; other surgical history ();  section (); Ovary removal (); and Breast reduction surgery (Bilateral, 10/10/2018). Ms. Pieter Currie   Current Outpatient Medications   Medication Sig Dispense Refill    Semaglutide-Weight Management (WEGOVY) 0.5 MG/0.5ML SOAJ SC injection Inject 0.5 mg into the skin every 7 days 4 mL 11    Insulin Pen Needle 31G X 5 MM MISC Use one daily with insulin 100 each 3    buPROPion (WELLBUTRIN XL) 150 MG extended release tablet TAKE 3 TABLETS BY MOUTH EVERY DAY IN THE MORNING 270 tablet 3    sertraline (ZOLOFT) 25 MG tablet Take 1 tablet by mouth daily 90 tablet 3    estradiol (VIVELLE) 0.075 MG/24HR PLACE 1 PATCH ON THE SKIN 2 TIMES A WEEK.       fluconazole (DIFLUCAN) 150 MG tablet TAKE 1 TABLET BY MOUTH ONCE FOR 1 DOSE WEEKLY AS NEEDED YEAST INFECTIONS      nitrofurantoin, macrocrystal-monohydrate, (MACROBID) 100 MG capsule Take 100 mg by mouth      ondansetron (ZOFRAN-ODT) 8 MG TBDP disintegrating tablet Take 8 mg by mouth every 8 hours as needed      oxybutynin (DITROPAN) 5 MG tablet Take 5 mg by mouth daily as needed      phenazopyridine (PYRIDIUM) 200 MG tablet Take 200 mg by mouth 3 times daily as needed       No current facility-administered medications for this visit. Ms. Becky Pagan History     Socioeconomic History    Marital status:      Spouse name: None    Number of children: None    Years of education: None    Highest education level: None   Occupational History    None   Tobacco Use    Smoking status: Former Smoker     Packs/day: 0.25     Quit date: 10/5/1992     Years since quittin.7    Smokeless tobacco: Never Used   Substance and Sexual Activity    Alcohol use: Yes    Drug use: No    Sexual activity: None   Other Topics Concern    None   Social History Narrative    None     Social Determinants of Health     Financial Resource Strain:     Difficulty of Paying Living Expenses: Not on file   Food Insecurity:     Worried About Running Out of Food in the Last Year: Not on file    [address] [place] [address] Place of Food in the Last Year: Not on file   Transportation Needs:     Lack of Transportation (Medical): Not on file    Lack of Transportation (Non-Medical):  Not on file   Physical Activity:     Days of Exercise per Week: Not on file    Minutes of Exercise per Session: Not on file   Stress:     Feeling of Stress : Not on file   Social Connections:     Frequency of Communication with Friends and Family: Not on file    Frequency of Social Gatherings with Friends and Family: Not on file    Attends [employer] Services: Not on file    Active Member of Clubs or Organizations: Not on file    Attends Club or Organization Meetings: Not on file    Marital Status: Not on file   Intimate Partner Violence:     Fear of Current or Ex-Partner: Not on file    Emotionally Abused: Not on file    Physically Abused: Not on file    Sexually Abused: Not on file   Housing Stability:     Unable to Pay for Housing in the Last Year: Not on file    Number of Places Lived in the Last Year: Not on file    Unstable Housing in the Last Year: Not on file       Ms. Hernandez   Family History   Problem Relation Age of Onset    Cancer Father     Diabetes Father     Heart Disease Father     Lung Disease Maternal Grandfather     Cancer Maternal Grandfather     Breast Cancer Other     Heart Disease Maternal Grandmother         valve replacement    Heart Disease Paternal Grandfather             Ms. Shefali Smith  has the following allergies: Allergies   Allergen Reactions    Amoxicillin Diarrhea       /87 (Site: Right Upper Arm, Position: Sitting, Cuff Size: Large Adult)   Pulse 92   Temp 98.6 °F (37 °C) (Temporal)   Resp 18   Ht 5' 1\" (1.549 m)   Wt 205 lb 9.6 oz (93.3 kg)   SpO2 97%   BMI 38.85 kg/m²     HEENT: Normocephalic, atraumatic, pupils equal and reactive to light. Neck: Supple, no masses or thyromegaly. Lungs: clear to auscultation bilaterally. CV: regular rate and rhythm, without murmurs, rubs, or gallops  Ext: No lower extremity edema. Osvaldo Lopez was seen today for follow-up. Diagnoses and all orders for this visit:    Class 2 obesity due to excess calories without serious comorbidity with body mass index (BMI) of 38.0 to 38.9 in adult  -     Semaglutide-Weight Management (WEGOVY) 0.5 MG/0.5ML SOAJ SC injection; Inject 0.5 mg into the skin every 7 days  -     Insulin Pen Needle 31G X 5 MM MISC; Use one daily with insulin    Anxiety  -     buPROPion (WELLBUTRIN XL) 150 MG extended release tablet; TAKE 3 TABLETS BY MOUTH EVERY DAY IN THE MORNING  -     sertraline (ZOLOFT) 25 MG tablet; Take 1 tablet by mouth daily          Continue Wellbutrin and Zoloft. Wegovy given and gone over for weight loss. She will follow-up in 1 month to see how she is doing. Call for abdominal pain or nausea or vomiting.   Amada Negron MD

## 2022-07-13 ENCOUNTER — HOSPITAL ENCOUNTER (OUTPATIENT)
Dept: LAB | Age: 52
Discharge: HOME | End: 2022-07-16

## 2022-07-13 PROCEDURE — 88305 TISSUE EXAM BY PATHOLOGIST: CPT

## 2022-07-14 ENCOUNTER — TELEPHONE (OUTPATIENT)
Dept: FAMILY MEDICINE CLINIC | Facility: CLINIC | Age: 52
End: 2022-07-14

## 2022-07-14 DIAGNOSIS — Z13.0 SCREENING, ANEMIA, DEFICIENCY, IRON: ICD-10-CM

## 2022-07-14 DIAGNOSIS — Z13.6 SCREENING FOR CARDIOVASCULAR CONDITION: ICD-10-CM

## 2022-07-14 DIAGNOSIS — Z13.1 SCREENING FOR DIABETES MELLITUS: Primary | ICD-10-CM

## 2022-07-14 DIAGNOSIS — Z13.29 SCREENING FOR THYROID DISORDER: ICD-10-CM

## 2022-07-14 DIAGNOSIS — Z11.4 SCREENING FOR HIV (HUMAN IMMUNODEFICIENCY VIRUS): ICD-10-CM

## 2022-07-20 ENCOUNTER — NURSE ONLY (OUTPATIENT)
Dept: FAMILY MEDICINE CLINIC | Facility: CLINIC | Age: 52
End: 2022-07-20

## 2022-07-20 DIAGNOSIS — Z13.6 SCREENING FOR CARDIOVASCULAR CONDITION: ICD-10-CM

## 2022-07-20 DIAGNOSIS — Z13.0 SCREENING, ANEMIA, DEFICIENCY, IRON: ICD-10-CM

## 2022-07-20 DIAGNOSIS — Z13.29 SCREENING FOR THYROID DISORDER: ICD-10-CM

## 2022-07-20 DIAGNOSIS — Z13.1 SCREENING FOR DIABETES MELLITUS: ICD-10-CM

## 2022-07-20 DIAGNOSIS — Z11.4 SCREENING FOR HIV (HUMAN IMMUNODEFICIENCY VIRUS): ICD-10-CM

## 2022-07-20 LAB
ALBUMIN SERPL-MCNC: 3.6 G/DL (ref 3.5–5)
ALBUMIN/GLOB SERPL: 1.1 {RATIO} (ref 1.2–3.5)
ALP SERPL-CCNC: 103 U/L (ref 50–136)
ALT SERPL-CCNC: 22 U/L (ref 12–65)
ANION GAP SERPL CALC-SCNC: 4 MMOL/L (ref 7–16)
AST SERPL-CCNC: 18 U/L (ref 15–37)
BASOPHILS # BLD: 0.1 K/UL (ref 0–0.2)
BASOPHILS NFR BLD: 1 % (ref 0–2)
BILIRUB SERPL-MCNC: 0.5 MG/DL (ref 0.2–1.1)
BUN SERPL-MCNC: 19 MG/DL (ref 6–23)
CALCIUM SERPL-MCNC: 8.7 MG/DL (ref 8.3–10.4)
CHLORIDE SERPL-SCNC: 108 MMOL/L (ref 98–107)
CHOLEST SERPL-MCNC: 183 MG/DL
CO2 SERPL-SCNC: 26 MMOL/L (ref 21–32)
CREAT SERPL-MCNC: 0.9 MG/DL (ref 0.6–1)
DIFFERENTIAL METHOD BLD: ABNORMAL
EOSINOPHIL # BLD: 0.1 K/UL (ref 0–0.8)
EOSINOPHIL NFR BLD: 2 % (ref 0.5–7.8)
ERYTHROCYTE [DISTWIDTH] IN BLOOD BY AUTOMATED COUNT: 13.4 % (ref 11.9–14.6)
GLOBULIN SER CALC-MCNC: 3.4 G/DL (ref 2.3–3.5)
GLUCOSE SERPL-MCNC: 104 MG/DL (ref 65–100)
HCT VFR BLD AUTO: 43.2 % (ref 35.8–46.3)
HDLC SERPL-MCNC: 46 MG/DL (ref 40–60)
HDLC SERPL: 4 {RATIO}
HGB BLD-MCNC: 13.3 G/DL (ref 11.7–15.4)
HIV 1+2 AB+HIV1 P24 AG SERPL QL IA: NONREACTIVE
HIV 1/2 RESULT COMMENT: NORMAL
IMM GRANULOCYTES # BLD AUTO: 0 K/UL (ref 0–0.5)
IMM GRANULOCYTES NFR BLD AUTO: 0 % (ref 0–5)
LDLC SERPL CALC-MCNC: 107.4 MG/DL
LYMPHOCYTES # BLD: 2.1 K/UL (ref 0.5–4.6)
LYMPHOCYTES NFR BLD: 30 % (ref 13–44)
MCH RBC QN AUTO: 29 PG (ref 26.1–32.9)
MCHC RBC AUTO-ENTMCNC: 30.8 G/DL (ref 31.4–35)
MCV RBC AUTO: 94.3 FL (ref 79.6–97.8)
MONOCYTES # BLD: 0.5 K/UL (ref 0.1–1.3)
MONOCYTES NFR BLD: 7 % (ref 4–12)
NEUTS SEG # BLD: 4.2 K/UL (ref 1.7–8.2)
NEUTS SEG NFR BLD: 59 % (ref 43–78)
NRBC # BLD: 0 K/UL (ref 0–0.2)
PLATELET # BLD AUTO: 281 K/UL (ref 150–450)
PMV BLD AUTO: 11 FL (ref 9.4–12.3)
POTASSIUM SERPL-SCNC: 4.8 MMOL/L (ref 3.5–5.1)
PROT SERPL-MCNC: 7 G/DL (ref 6.3–8.2)
RBC # BLD AUTO: 4.58 M/UL (ref 4.05–5.2)
SODIUM SERPL-SCNC: 138 MMOL/L (ref 136–145)
TRIGL SERPL-MCNC: 148 MG/DL (ref 35–150)
TSH, 3RD GENERATION: 0.65 UIU/ML (ref 0.36–3.74)
VLDLC SERPL CALC-MCNC: 29.6 MG/DL (ref 6–23)
WBC # BLD AUTO: 7 K/UL (ref 4.3–11.1)

## 2022-07-27 ENCOUNTER — OFFICE VISIT (OUTPATIENT)
Dept: FAMILY MEDICINE CLINIC | Facility: CLINIC | Age: 52
End: 2022-07-27
Payer: COMMERCIAL

## 2022-07-27 VITALS
HEART RATE: 93 BPM | DIASTOLIC BLOOD PRESSURE: 78 MMHG | TEMPERATURE: 97.2 F | HEIGHT: 61 IN | OXYGEN SATURATION: 96 % | WEIGHT: 206 LBS | BODY MASS INDEX: 38.89 KG/M2 | RESPIRATION RATE: 16 BRPM | SYSTOLIC BLOOD PRESSURE: 128 MMHG

## 2022-07-27 DIAGNOSIS — J01.00 ACUTE NON-RECURRENT MAXILLARY SINUSITIS: ICD-10-CM

## 2022-07-27 DIAGNOSIS — N31.9 NEUROGENIC BLADDER: ICD-10-CM

## 2022-07-27 DIAGNOSIS — F41.9 ANXIETY: ICD-10-CM

## 2022-07-27 DIAGNOSIS — Z00.00 ROUTINE GENERAL MEDICAL EXAMINATION AT A HEALTH CARE FACILITY: Primary | ICD-10-CM

## 2022-07-27 PROCEDURE — 99396 PREV VISIT EST AGE 40-64: CPT | Performed by: FAMILY MEDICINE

## 2022-07-27 PROCEDURE — 99213 OFFICE O/P EST LOW 20 MIN: CPT | Performed by: FAMILY MEDICINE

## 2022-07-27 RX ORDER — CEFUROXIME AXETIL 500 MG/1
500 TABLET ORAL 2 TIMES DAILY
Qty: 20 TABLET | Refills: 0 | Status: SHIPPED | OUTPATIENT
Start: 2022-07-27 | End: 2022-08-06

## 2022-07-27 ASSESSMENT — PATIENT HEALTH QUESTIONNAIRE - PHQ9
2. FEELING DOWN, DEPRESSED OR HOPELESS: 0
SUM OF ALL RESPONSES TO PHQ QUESTIONS 1-9: 0
SUM OF ALL RESPONSES TO PHQ QUESTIONS 1-9: 0
1. LITTLE INTEREST OR PLEASURE IN DOING THINGS: 0
SUM OF ALL RESPONSES TO PHQ QUESTIONS 1-9: 0
SUM OF ALL RESPONSES TO PHQ QUESTIONS 1-9: 0
SUM OF ALL RESPONSES TO PHQ9 QUESTIONS 1 & 2: 0

## 2022-07-27 NOTE — PROGRESS NOTES
[address] [facility] [address]  Phone: [phone] Fax [phone]  Martha Yanes MD          Ms. Ghazala Blandon  is a 45y.o.  year old  female patient who comes in for complete physical. She has been doing well. No fatigue, but has had weight gain. No shortness of breath, chest pain, palpitations. No abdominal pain, diarrhea, or constipation. No abnormal skin changes. Has  been working on diet and exercise. Feels well. ROS:  Feeling well. No dyspnea or chest pain on exertion. No abdominal pain, change in bowel habits, black or bloody stools. No urinary tract symptoms. GYN ROS: no breast pain or new or enlarging lumps on self exam. Menopausal symptoms: none. No neurological complaints. Last DEXA scan and T-score: none  Last Colonoscopy:   Last Mammogram:   Last PAP:       Her insurance would not pay for the [facility] for weight loss. She tried Plenity and it did not really work for her. She does see urology about her neurogenic bladder. She has had a lot of pressure in her left ear and over her left sinus. She did take amoxicillin for an ear infection and the air did get better but she still is having the pressure sensation in her sinus and left ear. She is doing well with the Zoloft and the Wellbutrin for her anxiety. Ms. Ghazala Blandon  has  has a past medical history of Anxiety, Inverted nipple, Menopause, Nausea & vomiting, and Self-catheterizes urinary bladder. Ms. Ghazala Blandon  has  has a past surgical history that includes Breast reduction surgery (10/12/2018); gyn; gyn; other surgical history ();  section (); Ovary removal (); and Breast reduction surgery (Bilateral, 10/10/2018). Ms. Ghazala Blandon   Current Outpatient Medications   Medication Sig Dispense Refill    cefUROXime (CEFTIN) 500 MG tablet Take 1 tablet by mouth in the morning and 1 tablet before bedtime. Do all this for 10 days.  20 tablet 0    buPROPion [facility] XL) 150 MG extended release tablet TAKE 3 TABLETS BY MOUTH EVERY DAY IN THE MORNING 270 tablet 3    sertraline (ZOLOFT) 25 MG tablet Take 1 tablet by mouth daily 90 tablet 3    estradiol (VIVELLE) 0.075 MG/24HR PLACE 1 PATCH ON THE SKIN 2 TIMES A WEEK. fluconazole (DIFLUCAN) 150 MG tablet TAKE 1 TABLET BY MOUTH ONCE FOR 1 DOSE WEEKLY AS NEEDED YEAST INFECTIONS      nitrofurantoin, macrocrystal-monohydrate, (MACROBID) 100 MG capsule Take 100 mg by mouth      ondansetron (ZOFRAN-ODT) 8 MG TBDP disintegrating tablet Take 8 mg by mouth every 8 hours as needed      oxybutynin (DITROPAN) 5 MG tablet Take 5 mg by mouth daily as needed      phenazopyridine (PYRIDIUM) 200 MG tablet Take 200 mg by mouth 3 times daily as needed       No current facility-administered medications for this visit. Ms. Megan Corbin family history includes Breast Cancer in an other family member; Cancer in her father and maternal grandfather; Diabetes in her father; Heart Disease in her father, maternal grandmother, and paternal grandfather; Lung Disease in her maternal grandfather.       Ms. Goode Settler History    Marital status:      Spouse name: Not on file    Number of children: Not on file    Years of education: Not on file    Highest education level: Not on file   Occupational History    Not on file   Tobacco Use    Smoking status: Former     Packs/day: 0.25     Types: Cigarettes     Quit date: 10/5/1992     Years since quittin.8    Smokeless tobacco: Never   Substance and Sexual Activity    Alcohol use: Yes    Drug use: No    Sexual activity: Not on file   Other Topics Concern    Not on file   Social History Narrative    Not on file     Social Determinants of Health     Financial Resource Strain: Not on file   Food Insecurity: Not on file   Transportation Needs: Not on file   Physical Activity: Not on file   Stress: Not on file   Social Connections: Not on file   Intimate Partner Violence: Not on file   Housing Stability: Not on file         Ms. Krista Baker  has the following allergies: Allergies   Allergen Reactions    Amoxicillin Diarrhea       /78   Pulse 93   Temp 97.2 °F (36.2 °C)   Resp 16   Ht 5' 1\" (1.549 m)   Wt 206 lb (93.4 kg)   SpO2 96%   BMI 38.92 kg/m²     General: alert, oriented x 3, pleasant. HEENT: Normocephalic, atraumatic, pupils equal and reactive to light. Tympanic membranes intact without erythema or edema. Oropharynx clear. Neck: Supple, no masses or thyromegaly or lymphadenopathy. Lungs: clear to auscultation bilaterally without wheezing or rhonchi. CV: regular rate and rhythm, without murmurs, rubs, or gallops. Abdomen: soft, nontender, nondistended, no masses. No hepatosplenomegaly. No abdominal bruits. Ext: No lower extremity edema. No lesions about the feet. Breasts: Deferred. Pelvic: deferred. Skin: no lesions. Monica Lares was seen today for annual exam.    Diagnoses and all orders for this visit:    Routine general medical examination at a health care facility    Neurogenic bladder    Anxiety    Acute non-recurrent maxillary sinusitis  -     cefUROXime (CEFTIN) 500 MG tablet; Take 1 tablet by mouth in the morning and 1 tablet before bedtime. Do all this for 10 days. Labs were gone over with the patient. She will continue to work on diet and exercise. Continue all above medications. Her refills are up-to-date. She is to call and let us know if her sinuses are not better. 30 minutes of moderate aerobic exercise 5 days a week and green leafy vegetables daily for cardiovascular risk reduction.       Robe Carmona MD

## 2023-02-13 ENCOUNTER — TELEPHONE (OUTPATIENT)
Dept: FAMILY MEDICINE CLINIC | Facility: CLINIC | Age: 53
End: 2023-02-13

## 2023-02-13 NOTE — TELEPHONE ENCOUNTER
D/C DATE: 02/12/2023    D/C FROM: [facility]    D/C TO: Home      PHONE NUMBER TO REACH PATIENT: [phone]      F/U APPT DATE & TIME: 02/15/2023

## 2023-02-13 NOTE — TELEPHONE ENCOUNTER
Care Transitions Initial Follow Up Call    Outreach made within 2 business days of discharge: Yes    Patient: Nora Bray Patient : 1970   MRN: [id number]  Reason for Admission: No discharge information exists for this patient. Discharge Date:         Spoke with: Patient     Discharge department/facility: [facility] TCM Interactive Patient Contact:  Was patient able to fill all prescriptions: Yes  Was patient instructed to bring all medications to the follow-up visit: Yes  Is patient taking all medications as directed in the discharge summary? Yes  Does patient understand their discharge instructions: Yes  Does patient have questions or concerns that need addressed prior to 7-14 day follow up office visit: yes - Pt states that she is groggy, but overall feels okay. Pt is aware of follow up appt.      Scheduled appointment with PCP within 7-14 days    Follow Up  Future Appointments   Date Time Provider Shirin Cedeño   2/15/2023  2:30 PM MD PRATIK VyasA [facility]   2023  9:00 AM FPA MISCELLANEOUS FPA [facility]   2023  3:45 PM Mateo Shine MD FPA [facility] AMB       [place], [place]

## 2023-02-15 ENCOUNTER — OFFICE VISIT (OUTPATIENT)
Dept: FAMILY MEDICINE CLINIC | Facility: CLINIC | Age: 53
End: 2023-02-15

## 2023-02-15 VITALS
TEMPERATURE: 97.3 F | SYSTOLIC BLOOD PRESSURE: 126 MMHG | WEIGHT: 215 LBS | RESPIRATION RATE: 16 BRPM | BODY MASS INDEX: 40.59 KG/M2 | DIASTOLIC BLOOD PRESSURE: 69 MMHG | HEART RATE: 104 BPM | OXYGEN SATURATION: 97 % | HEIGHT: 61 IN

## 2023-02-15 DIAGNOSIS — Z12.31 ENCOUNTER FOR SCREENING MAMMOGRAM FOR MALIGNANT NEOPLASM OF BREAST: ICD-10-CM

## 2023-02-15 DIAGNOSIS — R41.82 ALTERED MENTAL STATUS, UNSPECIFIED ALTERED MENTAL STATUS TYPE: Primary | ICD-10-CM

## 2023-02-15 DIAGNOSIS — E66.01 OBESITY, CLASS III, BMI 40-49.9 (MORBID OBESITY) (HCC): ICD-10-CM

## 2023-02-15 DIAGNOSIS — G43.009 ATYPICAL MIGRAINE: ICD-10-CM

## 2023-02-15 DIAGNOSIS — G43.809 OTHER MIGRAINE WITHOUT STATUS MIGRAINOSUS, NOT INTRACTABLE: ICD-10-CM

## 2023-02-15 DIAGNOSIS — Z09 HOSPITAL DISCHARGE FOLLOW-UP: ICD-10-CM

## 2023-02-15 SDOH — ECONOMIC STABILITY: HOUSING INSECURITY
IN THE LAST 12 MONTHS, WAS THERE A TIME WHEN YOU DID NOT HAVE A STEADY PLACE TO SLEEP OR SLEPT IN A SHELTER (INCLUDING NOW)?: NO

## 2023-02-15 SDOH — ECONOMIC STABILITY: FOOD INSECURITY: WITHIN THE PAST 12 MONTHS, THE FOOD YOU BOUGHT JUST DIDN'T LAST AND YOU DIDN'T HAVE MONEY TO GET MORE.: NEVER TRUE

## 2023-02-15 SDOH — ECONOMIC STABILITY: FOOD INSECURITY: WITHIN THE PAST 12 MONTHS, YOU WORRIED THAT YOUR FOOD WOULD RUN OUT BEFORE YOU GOT MONEY TO BUY MORE.: NEVER TRUE

## 2023-02-15 SDOH — ECONOMIC STABILITY: INCOME INSECURITY: HOW HARD IS IT FOR YOU TO PAY FOR THE VERY BASICS LIKE FOOD, HOUSING, MEDICAL CARE, AND HEATING?: NOT HARD AT ALL

## 2023-02-15 ASSESSMENT — PATIENT HEALTH QUESTIONNAIRE - PHQ9
1. LITTLE INTEREST OR PLEASURE IN DOING THINGS: 0
2. FEELING DOWN, DEPRESSED OR HOPELESS: 0
SUM OF ALL RESPONSES TO PHQ QUESTIONS 1-9: 0
SUM OF ALL RESPONSES TO PHQ QUESTIONS 1-9: 0
SUM OF ALL RESPONSES TO PHQ9 QUESTIONS 1 & 2: 0
SUM OF ALL RESPONSES TO PHQ QUESTIONS 1-9: 0
SUM OF ALL RESPONSES TO PHQ QUESTIONS 1-9: 0

## 2023-02-15 NOTE — PROGRESS NOTES
Post-Discharge Transitional Care Management Progress Note      Myra Reyes   YOB: 1970    Date of Office Visit:  2/15/2023  Date of Hospital Admission: 2/11/23  Date of Hospital Discharge: 2/12/23    Care management risk score Rising risk (score 2-5) and Complex Care (Scores >=6): No Risk Score On File     Non face to face  following discharge, date last encounter closed (first attempt may have been earlier): 02/13/2023 02/13/2023    Call initiated 2 business days of discharge: Yes    ASSESSMENT/PLAN:   Altered mental status, unspecified altered mental status type  -     External Referral To Neurology  Encounter for screening mammogram for malignant neoplasm of breast  -     [facility] DIGITAL SCREEN W OR WO CAD BILATERAL; Future  Hospital discharge follow-up  -     [place] DISCHARGE MEDS RECONCILED W/ CURRENT OUTPATIENT MED LIST  Obesity, Class III, BMI 40-49.9 (morbid obesity) ([facility] [address]  Other migraine without status migrainosus, not intractable  -     External Referral To Neurology  Atypical migraine  -     External Referral To Neurology      Medical Decision Making: high complexity  Return if symptoms worsen or fail to improve. Subjective:   HPI:  Follow up of Hospital problems/diagnosis(es): Patient was seen because she had altered mental status and ended up being admitted for possible stroke. She was determined to have had a complex migraine. Her altered mental status did resolve and she was discharged home the following day. She had multiple tests including MRI, CTA, blood work and all of that was normal.  Neurology did see her in the hospital and recommended that she be discharged home with follow-up and possible neurology referral.      Per the hospital, she came into the ER with complaint of altered mental status weakness and headache.  Patient states that she was doing fairly well, had recent trip to [facility]) for about a week, and has been home for about for 5 days, no major issues during her trip, was doing fine last few days at home just a little tired, then went to lunch today and had wings with 2 beers and while she was eating food she became more lethargic, pale, diaphoretic, confused and disoriented had a nosebleed and so she was brought in to the emergency room. When she got here she was complaining of headache and was confused and so stat stroke alert was called, head CT was negative for acute bleed, neurology saw patient and they feel the patient has most likely migraine with neurologic accompaniment but need to rule out acute stroke. Plan is to admit to the hospitalist service. She is clinically improving with less confusion and disorientation much more awake and alert. She is the one who provided most of the history for me.  is at [address] [place] [address] Course is as below: Altered mental status/acute stroke and TIA ruled out/possible migraine: Patient presented with altered mental status that happened suddenly yesterday, work-up so far has been negative, appreciate teleneurology consult, MRI was negative for acute etiology, no events on telemetry, echo is pending but I do not think that we will  at this time. Patient is back to her baseline, she is awake alert oriented, she is still little weak but no focal deficits. According to family her mental status is back to normal. Unsure of the etiology of her symptoms. For now expectant management, may need neurology referral if symptoms recur. Interval history/Current status: She is better though she says she feels slightly foggy headed.     Patient Active Problem List   Diagnosis    Neurogenic bladder    Class 3 severe obesity due to excess calories without serious comorbidity with body mass index (BMI) of 40.0 to 44.9 in adult [facility])    Anxiety       Medications listed as ordered at the time of discharge from hospital     Medication List            Accurate as of February 15, 2023  3:20 PM. If you have any questions, ask your nurse or doctor. CONTINUE taking these medications      buPROPion 150 MG extended release tablet  Commonly known as: WELLBUTRIN XL  TAKE 3 TABLETS BY MOUTH EVERY DAY IN THE MORNING     estradiol 0.075 MG/24HR  Commonly known as: VIVELLE     fluconazole 150 MG tablet  Commonly known as: DIFLUCAN     nitrofurantoin (macrocrystal-monohydrate) 100 MG capsule  Commonly known as: MACROBID     ondansetron 8 MG Tbdp disintegrating tablet  Commonly known as: ZOFRAN-ODT     oxybutynin 5 MG tablet  Commonly known as: DITROPAN     phenazopyridine 200 MG tablet  Commonly known as: PYRIDIUM     sertraline 25 MG tablet  Commonly known as: ZOLOFT  Take 1 tablet by mouth daily                Medications marked \"taking\" at this time  Outpatient Medications Marked as Taking for the 2/15/23 encounter (Office Visit) with Tamera Guillaume MD   Medication Sig Dispense Refill    buPROPion (WELLBUTRIN XL) 150 MG extended release tablet TAKE 3 TABLETS BY MOUTH EVERY DAY IN THE MORNING 270 tablet 3    sertraline (ZOLOFT) 25 MG tablet Take 1 tablet by mouth daily 90 tablet 3    estradiol (VIVELLE) 0.075 MG/24HR PLACE 1 PATCH ON THE SKIN 2 TIMES A WEEK. fluconazole (DIFLUCAN) 150 MG tablet TAKE 1 TABLET BY MOUTH ONCE FOR 1 DOSE WEEKLY AS NEEDED YEAST INFECTIONS      nitrofurantoin, macrocrystal-monohydrate, (MACROBID) 100 MG capsule Take 100 mg by mouth      ondansetron (ZOFRAN-ODT) 8 MG TBDP disintegrating tablet Take 8 mg by mouth every 8 hours as needed      oxybutynin (DITROPAN) 5 MG tablet Take 5 mg by mouth daily as needed      phenazopyridine (PYRIDIUM) 200 MG tablet Take 200 mg by mouth 3 times daily as needed          Medications patient taking as of now reconciled against medications ordered at time of hospital discharge: Yes    Patient is referred to neurology for this complicated migraine. She will not do any traveling until she is seen by them.   She will do ibuprofen 2 tablets twice daily until seen by them. She may need a preventative for complicated migraine. Objective:    /69   Pulse (!) 104   Temp 97.3 °F (36.3 °C)   Resp 16   Ht 5' 1\" (1.549 m)   Wt 215 lb (97.5 kg)   SpO2 97%   BMI 40.62 kg/m²   Neck: neck supple and non tender without mass, no thyromegaly or thyroid nodules, no cervical lymphadenopathy   Pulmonary/Chest: clear to auscultation bilaterally- no wheezes, rales or rhonchi, normal air movement, no respiratory distress  Cardiovascular: normal rate, normal S1 and S2, and no gallops    An electronic signature was used to authenticate this note.   --Matthew Resendez MD

## 2023-02-15 NOTE — LETTER
[facility] [address] [address]  Phone: [phone]  Fax: [phone]    Maria Esther Wilson MD        February 15, 2023     Patient: George Mancuso   YOB: 1970   Date of Visit: 2/15/2023       To Whom It May Concern: It is my medical opinion that Jin Calderon must not travel until seen by Neurology. If you have any questions or concerns, please don't hesitate to call.     Sincerely,        Maria Esther Wilson MD

## 2023-05-01 ENCOUNTER — OFFICE VISIT (OUTPATIENT)
Dept: FAMILY MEDICINE CLINIC | Facility: CLINIC | Age: 53
End: 2023-05-01
Payer: COMMERCIAL

## 2023-05-01 VITALS
DIASTOLIC BLOOD PRESSURE: 83 MMHG | OXYGEN SATURATION: 99 % | WEIGHT: 220 LBS | SYSTOLIC BLOOD PRESSURE: 146 MMHG | RESPIRATION RATE: 16 BRPM | TEMPERATURE: 97.2 F | HEIGHT: 61 IN | HEART RATE: 87 BPM | BODY MASS INDEX: 41.54 KG/M2

## 2023-05-01 DIAGNOSIS — J01.00 ACUTE NON-RECURRENT MAXILLARY SINUSITIS: Primary | ICD-10-CM

## 2023-05-01 PROCEDURE — 99213 OFFICE O/P EST LOW 20 MIN: CPT | Performed by: FAMILY MEDICINE

## 2023-05-01 RX ORDER — SERTRALINE HYDROCHLORIDE 25 MG/1
25 TABLET, FILM COATED ORAL DAILY
Qty: 90 TABLET | Refills: 3 | Status: CANCELLED | OUTPATIENT
Start: 2023-05-01

## 2023-05-01 RX ORDER — CEFUROXIME AXETIL 500 MG/1
500 TABLET ORAL 2 TIMES DAILY
Qty: 14 TABLET | Refills: 0 | Status: SHIPPED | OUTPATIENT
Start: 2023-05-01 | End: 2023-05-08

## 2023-05-01 RX ORDER — BUPROPION HYDROCHLORIDE 150 MG/1
TABLET ORAL
Qty: 270 TABLET | Refills: 3 | Status: CANCELLED | OUTPATIENT
Start: 2023-05-01

## 2023-05-01 ASSESSMENT — PATIENT HEALTH QUESTIONNAIRE - PHQ9
SUM OF ALL RESPONSES TO PHQ QUESTIONS 1-9: 0
SUM OF ALL RESPONSES TO PHQ9 QUESTIONS 1 & 2: 0
1. LITTLE INTEREST OR PLEASURE IN DOING THINGS: 0
SUM OF ALL RESPONSES TO PHQ QUESTIONS 1-9: 0
2. FEELING DOWN, DEPRESSED OR HOPELESS: 0

## 2023-05-01 NOTE — PROGRESS NOTES
Not on file   Intimate Partner Violence: Not on file   Housing Stability: Unknown    Unable to Pay for Housing in the Last Year: Not on file    Number of [place] in the Last Year: Not on file    Unstable Housing in the Last Year: No       Allergies   Allergen Reactions    Amoxicillin Diarrhea       BP (!) 146/83   Pulse 87   Temp 97.2 °F (36.2 °C)   Resp 16   Ht 5' 1\" (1.549 m)   Wt 220 lb (99.8 kg)   SpO2 99%   BMI 41.57 kg/m²     HEENT: Normocephalic, atraumatic, pupils equal and reactive to light and accommodation, tympanic membranes intact without erythema or edema, oropharynx clear with no erythema or tonsillar exudate  Neck :supple, no masses, no thyromegaly, no lymphadenopathy  Lungs: clear to auscultation bilaterally  CV: regular rate and rhythm, without murmurs, rubs, or gallops    No results found for this visit on 05/01/23. Heath Adjutant was seen today for congestion. Diagnoses and all orders for this visit:    Acute non-recurrent maxillary sinusitis  -     cefUROXime (CEFTIN) 500 MG tablet; Take 1 tablet by mouth 2 times daily for 7 days    We will discuss tapering her off Zoloft when she returns for her physical in July. Did encourage her to go for the additional neurology tests as this will complete the work-up for her altered mental status.     Mike Beth MD

## 2023-05-25 ENCOUNTER — TELEPHONE (OUTPATIENT)
Dept: FAMILY MEDICINE CLINIC | Facility: CLINIC | Age: 53
End: 2023-05-25

## 2023-07-13 ENCOUNTER — TELEPHONE (OUTPATIENT)
Dept: FAMILY MEDICINE CLINIC | Facility: CLINIC | Age: 53
End: 2023-07-13

## 2023-07-13 DIAGNOSIS — Z13.0 SCREENING, ANEMIA, DEFICIENCY, IRON: ICD-10-CM

## 2023-07-13 DIAGNOSIS — Z13.1 SCREENING FOR DIABETES MELLITUS: Primary | ICD-10-CM

## 2023-07-13 DIAGNOSIS — Z13.6 SCREENING FOR CARDIOVASCULAR CONDITION: ICD-10-CM

## 2023-07-13 DIAGNOSIS — F41.9 ANXIETY: ICD-10-CM

## 2023-07-13 RX ORDER — SERTRALINE HYDROCHLORIDE 25 MG/1
25 TABLET, FILM COATED ORAL DAILY
Qty: 30 TABLET | Refills: 0 | Status: SHIPPED | OUTPATIENT
Start: 2023-07-13

## 2023-07-14 ENCOUNTER — NURSE ONLY (OUTPATIENT)
Dept: FAMILY MEDICINE CLINIC | Facility: CLINIC | Age: 53
End: 2023-07-14

## 2023-07-14 DIAGNOSIS — Z13.1 SCREENING FOR DIABETES MELLITUS: ICD-10-CM

## 2023-07-14 DIAGNOSIS — Z13.6 SCREENING FOR CARDIOVASCULAR CONDITION: ICD-10-CM

## 2023-07-14 DIAGNOSIS — Z13.0 SCREENING, ANEMIA, DEFICIENCY, IRON: ICD-10-CM

## 2023-07-14 LAB
ALBUMIN SERPL-MCNC: 3.7 G/DL (ref 3.5–5)
ALBUMIN/GLOB SERPL: 0.8 (ref 0.4–1.6)
ALP SERPL-CCNC: 105 U/L (ref 50–136)
ALT SERPL-CCNC: 33 U/L (ref 12–65)
ANION GAP SERPL CALC-SCNC: 3 MMOL/L (ref 2–11)
AST SERPL-CCNC: 20 U/L (ref 15–37)
BASOPHILS # BLD: 0.1 K/UL (ref 0–0.2)
BASOPHILS NFR BLD: 1 % (ref 0–2)
BILIRUB SERPL-MCNC: 0.7 MG/DL (ref 0.2–1.1)
BUN SERPL-MCNC: 16 MG/DL (ref 6–23)
CALCIUM SERPL-MCNC: 9 MG/DL (ref 8.3–10.4)
CHLORIDE SERPL-SCNC: 106 MMOL/L (ref 101–110)
CHOLEST SERPL-MCNC: 181 MG/DL
CO2 SERPL-SCNC: 28 MMOL/L (ref 21–32)
CREAT SERPL-MCNC: 1.1 MG/DL (ref 0.6–1)
DIFFERENTIAL METHOD BLD: ABNORMAL
EOSINOPHIL # BLD: 0 K/UL (ref 0–0.8)
EOSINOPHIL NFR BLD: 1 % (ref 0.5–7.8)
ERYTHROCYTE [DISTWIDTH] IN BLOOD BY AUTOMATED COUNT: 13.7 % (ref 11.9–14.6)
GLOBULIN SER CALC-MCNC: 4.4 G/DL (ref 2.8–4.5)
GLUCOSE SERPL-MCNC: 105 MG/DL (ref 65–100)
HCT VFR BLD AUTO: 42.5 % (ref 35.8–46.3)
HDLC SERPL-MCNC: 49 MG/DL (ref 40–60)
HDLC SERPL: 3.7
HGB BLD-MCNC: 13.2 G/DL (ref 11.7–15.4)
IMM GRANULOCYTES # BLD AUTO: 0 K/UL (ref 0–0.5)
IMM GRANULOCYTES NFR BLD AUTO: 0 % (ref 0–5)
LDLC SERPL CALC-MCNC: 100.4 MG/DL
LYMPHOCYTES # BLD: 2.2 K/UL (ref 0.5–4.6)
LYMPHOCYTES NFR BLD: 29 % (ref 13–44)
MCH RBC QN AUTO: 28.4 PG (ref 26.1–32.9)
MCHC RBC AUTO-ENTMCNC: 31.1 G/DL (ref 31.4–35)
MCV RBC AUTO: 91.6 FL (ref 82–102)
MONOCYTES # BLD: 0.6 K/UL (ref 0.1–1.3)
MONOCYTES NFR BLD: 8 % (ref 4–12)
NEUTS SEG # BLD: 4.6 K/UL (ref 1.7–8.2)
NEUTS SEG NFR BLD: 61 % (ref 43–78)
NRBC # BLD: 0 K/UL (ref 0–0.2)
PLATELET # BLD AUTO: 299 K/UL (ref 150–450)
PMV BLD AUTO: 11 FL (ref 9.4–12.3)
POTASSIUM SERPL-SCNC: 4.2 MMOL/L (ref 3.5–5.1)
PROT SERPL-MCNC: 8.1 G/DL (ref 6.3–8.2)
RBC # BLD AUTO: 4.64 M/UL (ref 4.05–5.2)
SODIUM SERPL-SCNC: 137 MMOL/L (ref 133–143)
TRIGL SERPL-MCNC: 158 MG/DL (ref 35–150)
VLDLC SERPL CALC-MCNC: 31.6 MG/DL (ref 6–23)
WBC # BLD AUTO: 7.5 K/UL (ref 4.3–11.1)

## 2023-07-24 ASSESSMENT — PATIENT HEALTH QUESTIONNAIRE - PHQ9
SUM OF ALL RESPONSES TO PHQ QUESTIONS 1-9: 0
2. FEELING DOWN, DEPRESSED OR HOPELESS: 0
SUM OF ALL RESPONSES TO PHQ9 QUESTIONS 1 & 2: 0
1. LITTLE INTEREST OR PLEASURE IN DOING THINGS: 0
SUM OF ALL RESPONSES TO PHQ9 QUESTIONS 1 & 2: 0
SUM OF ALL RESPONSES TO PHQ QUESTIONS 1-9: 0
1. LITTLE INTEREST OR PLEASURE IN DOING THINGS: NOT AT ALL
SUM OF ALL RESPONSES TO PHQ QUESTIONS 1-9: 0
SUM OF ALL RESPONSES TO PHQ QUESTIONS 1-9: 0
2. FEELING DOWN, DEPRESSED OR HOPELESS: NOT AT ALL

## 2023-07-26 ENCOUNTER — OFFICE VISIT (OUTPATIENT)
Dept: FAMILY MEDICINE CLINIC | Facility: CLINIC | Age: 53
End: 2023-07-26
Payer: COMMERCIAL

## 2023-07-26 VITALS
TEMPERATURE: 98.4 F | WEIGHT: 216 LBS | DIASTOLIC BLOOD PRESSURE: 74 MMHG | HEIGHT: 61 IN | SYSTOLIC BLOOD PRESSURE: 128 MMHG | BODY MASS INDEX: 40.78 KG/M2 | OXYGEN SATURATION: 97 % | RESPIRATION RATE: 16 BRPM | HEART RATE: 91 BPM

## 2023-07-26 DIAGNOSIS — E66.01 CLASS 3 SEVERE OBESITY DUE TO EXCESS CALORIES WITHOUT SERIOUS COMORBIDITY WITH BODY MASS INDEX (BMI) OF 40.0 TO 44.9 IN ADULT (HCC): ICD-10-CM

## 2023-07-26 DIAGNOSIS — F41.9 ANXIETY: ICD-10-CM

## 2023-07-26 DIAGNOSIS — Z00.00 ROUTINE GENERAL MEDICAL EXAMINATION AT A HEALTH CARE FACILITY: Primary | ICD-10-CM

## 2023-07-26 PROCEDURE — 99396 PREV VISIT EST AGE 40-64: CPT | Performed by: FAMILY MEDICINE

## 2023-07-26 PROCEDURE — 99214 OFFICE O/P EST MOD 30 MIN: CPT | Performed by: FAMILY MEDICINE

## 2023-07-26 RX ORDER — SEMAGLUTIDE 0.25 MG/.5ML
0.25 INJECTION, SOLUTION SUBCUTANEOUS
Qty: 0.5 ML | Refills: 11 | Status: SHIPPED | OUTPATIENT
Start: 2023-07-26 | End: 2023-07-26 | Stop reason: SDUPTHER

## 2023-07-26 RX ORDER — BUPROPION HYDROCHLORIDE 150 MG/1
TABLET ORAL
Qty: 270 TABLET | Refills: 3 | Status: SHIPPED | OUTPATIENT
Start: 2023-07-26

## 2023-07-26 RX ORDER — PROGESTERONE 200 MG/1
CAPSULE ORAL
COMMUNITY
Start: 2023-06-30

## 2023-07-26 RX ORDER — SEMAGLUTIDE 0.25 MG/.5ML
0.25 INJECTION, SOLUTION SUBCUTANEOUS
Qty: 0.5 ML | Refills: 11 | Status: SHIPPED | OUTPATIENT
Start: 2023-07-26

## 2023-07-26 NOTE — PROGRESS NOTES
problems but does not get that weight loss she needs she will return to see me and we can increase the dose. We did go over the side effects and risks. Taper off Zoloft by taking half a tablet a day for 7 days and then a half a tablet every other day for 7 days and then stop. She will know in a month if she needs to get back on it. Continue the Wellbutrin however. Weight watchers now. 30 minutes of moderate aerobic exercise 5 days a week and green leafy vegetables daily for cardiovascular risk reduction.       Dafne Mancia MD

## 2023-08-06 DIAGNOSIS — F41.9 ANXIETY: ICD-10-CM

## 2023-08-07 RX ORDER — SERTRALINE HYDROCHLORIDE 25 MG/1
TABLET, FILM COATED ORAL
Qty: 30 TABLET | Refills: 0 | Status: SHIPPED | OUTPATIENT
Start: 2023-08-07

## 2023-09-03 DIAGNOSIS — F41.9 ANXIETY: ICD-10-CM

## 2023-09-04 RX ORDER — SERTRALINE HYDROCHLORIDE 25 MG/1
TABLET, FILM COATED ORAL
Qty: 30 TABLET | Refills: 0 | Status: SHIPPED | OUTPATIENT
Start: 2023-09-04

## 2023-10-11 DIAGNOSIS — F41.9 ANXIETY: ICD-10-CM

## 2023-10-11 RX ORDER — SERTRALINE HYDROCHLORIDE 25 MG/1
TABLET, FILM COATED ORAL
Qty: 30 TABLET | Refills: 0 | Status: SHIPPED | OUTPATIENT
Start: 2023-10-11

## 2023-11-28 ENCOUNTER — TELEPHONE (OUTPATIENT)
Dept: FAMILY MEDICINE CLINIC | Facility: CLINIC | Age: 53
End: 2023-11-28

## 2023-11-28 DIAGNOSIS — F41.9 ANXIETY: ICD-10-CM

## 2023-11-28 RX ORDER — BUPROPION HYDROCHLORIDE 150 MG/1
TABLET ORAL
Qty: 90 TABLET | Refills: 3 | Status: SHIPPED | OUTPATIENT
Start: 2023-11-28

## 2023-11-28 NOTE — TELEPHONE ENCOUNTER
Spoke with pt and states that she is taking 3 tablets to equal 150mg of Wellburtin. . Per rx it states that she is taking Wellbutrin 150mg 3x daily.  Please clarify

## 2023-11-28 NOTE — TELEPHONE ENCOUNTER
----- Message from [facility]. Mary sent at 11/28/2023 10:44 AM EST -----  Regarding: Bupropion HCL  MG refill  Contact: [phone]  Hi Dr. Theresa Villatoro,   I went to refill my Bupropion HCL prescription. The pharmacy stated that my new insurance would not approve the 3 tablet form and needs a new prescription for one tablet form in 150MG. Can you send over a new prescription with the one tablet form? It's at [facility] on [address] [place] [address]. I have one more night of Meds left for tonight. Please call me with Questions. Juan F Harper.  Giovanny Aldrich

## 2024-01-04 ENCOUNTER — OFFICE VISIT (OUTPATIENT)
Dept: FAMILY MEDICINE CLINIC | Facility: CLINIC | Age: 54
End: 2024-01-04
Payer: COMMERCIAL

## 2024-01-04 VITALS
TEMPERATURE: 98.6 F | HEART RATE: 79 BPM | BODY MASS INDEX: 40.59 KG/M2 | DIASTOLIC BLOOD PRESSURE: 89 MMHG | OXYGEN SATURATION: 96 % | HEIGHT: 61 IN | SYSTOLIC BLOOD PRESSURE: 149 MMHG | RESPIRATION RATE: 16 BRPM | WEIGHT: 215 LBS

## 2024-01-04 DIAGNOSIS — Z23 NEED FOR IMMUNIZATION AGAINST INFLUENZA: ICD-10-CM

## 2024-01-04 DIAGNOSIS — J01.90 ACUTE SINUSITIS, RECURRENCE NOT SPECIFIED, UNSPECIFIED LOCATION: Primary | ICD-10-CM

## 2024-01-04 DIAGNOSIS — R50.9 FEVER, UNSPECIFIED FEVER CAUSE: ICD-10-CM

## 2024-01-04 LAB
EXP DATE SOLUTION: NORMAL
EXP DATE SWAB: NORMAL
EXPIRATION DATE: NORMAL
INFLUENZA A ANTIGEN, POC: NEGATIVE
INFLUENZA B ANTIGEN, POC: NEGATIVE
LOT NUMBER POC: NORMAL
LOT NUMBER SOLUTION: NORMAL
LOT NUMBER SWAB: NORMAL
SARS-COV-2 RNA, POC: NEGATIVE
VALID INTERNAL CONTROL, POC: YES

## 2024-01-04 PROCEDURE — 87635 SARS-COV-2 COVID-19 AMP PRB: CPT | Performed by: NURSE PRACTITIONER

## 2024-01-04 PROCEDURE — 99214 OFFICE O/P EST MOD 30 MIN: CPT | Performed by: NURSE PRACTITIONER

## 2024-01-04 PROCEDURE — 87502 INFLUENZA DNA AMP PROBE: CPT | Performed by: NURSE PRACTITIONER

## 2024-01-04 RX ORDER — AZITHROMYCIN 250 MG/1
250 TABLET, FILM COATED ORAL SEE ADMIN INSTRUCTIONS
Qty: 6 TABLET | Refills: 0 | Status: SHIPPED | OUTPATIENT
Start: 2024-01-04 | End: 2024-01-09

## 2024-01-04 RX ORDER — SULFAMETHOXAZOLE AND TRIMETHOPRIM 800; 160 MG/1; MG/1
TABLET ORAL
COMMUNITY
Start: 2023-11-09 | End: 2024-01-04

## 2024-01-04 SDOH — ECONOMIC STABILITY: INCOME INSECURITY: HOW HARD IS IT FOR YOU TO PAY FOR THE VERY BASICS LIKE FOOD, HOUSING, MEDICAL CARE, AND HEATING?: NOT HARD AT ALL

## 2024-01-04 SDOH — ECONOMIC STABILITY: FOOD INSECURITY: WITHIN THE PAST 12 MONTHS, YOU WORRIED THAT YOUR FOOD WOULD RUN OUT BEFORE YOU GOT MONEY TO BUY MORE.: NEVER TRUE

## 2024-01-04 SDOH — ECONOMIC STABILITY: FOOD INSECURITY: WITHIN THE PAST 12 MONTHS, THE FOOD YOU BOUGHT JUST DIDN'T LAST AND YOU DIDN'T HAVE MONEY TO GET MORE.: NEVER TRUE

## 2024-01-04 ASSESSMENT — PATIENT HEALTH QUESTIONNAIRE - PHQ9
SUM OF ALL RESPONSES TO PHQ QUESTIONS 1-9: 0
2. FEELING DOWN, DEPRESSED OR HOPELESS: 0
1. LITTLE INTEREST OR PLEASURE IN DOING THINGS: 0
SUM OF ALL RESPONSES TO PHQ9 QUESTIONS 1 & 2: 0
SUM OF ALL RESPONSES TO PHQ QUESTIONS 1-9: 0

## 2024-01-04 ASSESSMENT — ENCOUNTER SYMPTOMS
COUGH: 1
VOMITING: 1
WHEEZING: 1
RHINORRHEA: 1
VOICE CHANGE: 1
DIARRHEA: 1
SINUS PRESSURE: 1
NAUSEA: 1
SINUS PAIN: 1
SORE THROAT: 0

## 2024-01-04 NOTE — PATIENT INSTRUCTIONS
Rest and drink plenty of water/fluids (unless your doctor has told you otherwise)   Use a humidifier or run a hot shower to create steam 3-4 times a day for approximately 10 minutes at a time (This helps lessen congestion)    Apply a warm, moist washcloth to your face 3-4 times a day   If you were prescribed an antibiotic finish it all even if you start to feel better   Avoid tobacco smoke and other environmental irritants

## 2024-01-04 NOTE — PROGRESS NOTES
[facility] [address] [address]  Phone: [phone] Fax [phone]  Estelle [place], [facility]           Helga Hernandez (: 1970) presents today c/o headache, congestion,cough, nasal drainage, fever, diarrhea, chills, vomiting phlegm since 2023. She has not checked her temperature but states she felt she had a fever.she states last fever was 2024  She denies body aches, CP, SOB      Chief Complaint   Patient presents with    URI     Has ran a fever for 4 days past , negative covid tests.    Congestion    Diarrhea    Emesis    Headache    Wheezing     Patient Active Problem List   Diagnosis    Neurogenic bladder    Class 3 severe obesity due to excess calories without serious comorbidity with body mass index (BMI) of 40.0 to 44.9 in adult ([facility])    Anxiety        Reviewed and updated this visit by provider:  Tobacco  Allergies  Meds  Problems  Med Hx  Surg Hx  Fam Hx           Review of Systems   Constitutional:  Positive for chills, fatigue and fever.   HENT:  Positive for congestion, postnasal drip, rhinorrhea, sinus pressure, sinus pain, sneezing and voice change. Negative for ear discharge, ear pain and sore throat.    Respiratory:  Positive for cough and wheezing.    Cardiovascular: Negative.    Gastrointestinal:  Positive for diarrhea, nausea and vomiting.   Neurological:  Positive for headaches (sinus headache). Negative for dizziness, syncope and light-headedness.       Visit Vitals  BP (!) 149/89 (Site: Left Lower Arm, Position: Sitting, Cuff Size: Large Adult)   Pulse 79   Temp 98.6 °F (37 °C) (Temporal)   Resp 16   Ht 1.549 m (5' 1\")   Wt 97.5 kg (215 lb)   SpO2 96%   BMI 40.62 kg/m²       Physical Exam  Vitals and nursing note reviewed.   Constitutional:       Appearance: She is obese.   HENT:      Ears:      Comments: Congested TM     Nose: Congestion present.      Mouth/Throat:      Lips: Pink.      Mouth: Mucous

## 2024-06-24 DIAGNOSIS — R42 DIZZINESS: ICD-10-CM

## 2024-06-24 RX ORDER — MECLIZINE HYDROCHLORIDE 25 MG/1
TABLET ORAL
Qty: 30 TABLET | Refills: 5 | Status: SHIPPED | OUTPATIENT
Start: 2024-06-24

## 2024-07-16 ENCOUNTER — TELEPHONE (OUTPATIENT)
Dept: FAMILY MEDICINE CLINIC | Facility: CLINIC | Age: 54
End: 2024-07-16

## 2024-07-16 DIAGNOSIS — Z00.00 ROUTINE GENERAL MEDICAL EXAMINATION AT A HEALTH CARE FACILITY: Primary | ICD-10-CM

## 2024-07-16 DIAGNOSIS — Z13.1 SCREENING FOR DIABETES MELLITUS: ICD-10-CM

## 2024-07-16 DIAGNOSIS — Z13.6 SCREENING FOR CARDIOVASCULAR CONDITION: ICD-10-CM

## 2024-07-16 DIAGNOSIS — Z13.0 SCREENING, ANEMIA, DEFICIENCY, IRON: ICD-10-CM

## 2024-07-17 ENCOUNTER — LAB (OUTPATIENT)
Dept: FAMILY MEDICINE CLINIC | Facility: CLINIC | Age: 54
End: 2024-07-17

## 2024-07-17 DIAGNOSIS — Z13.6 SCREENING FOR CARDIOVASCULAR CONDITION: ICD-10-CM

## 2024-07-17 DIAGNOSIS — Z13.1 SCREENING FOR DIABETES MELLITUS: ICD-10-CM

## 2024-07-17 DIAGNOSIS — Z13.0 SCREENING, ANEMIA, DEFICIENCY, IRON: ICD-10-CM

## 2024-07-17 DIAGNOSIS — Z00.00 ROUTINE GENERAL MEDICAL EXAMINATION AT A HEALTH CARE FACILITY: ICD-10-CM

## 2024-07-17 LAB
ALBUMIN SERPL-MCNC: 3.6 G/DL (ref 3.5–5)
ALBUMIN/GLOB SERPL: 1.2 (ref 1–1.9)
ALP SERPL-CCNC: 100 U/L (ref 35–104)
ALT SERPL-CCNC: 25 U/L (ref 12–65)
ANION GAP SERPL CALC-SCNC: 11 MMOL/L (ref 9–18)
AST SERPL-CCNC: 23 U/L (ref 15–37)
BASOPHILS # BLD: 0.1 K/UL (ref 0–0.2)
BASOPHILS NFR BLD: 1 % (ref 0–2)
BILIRUB SERPL-MCNC: 0.5 MG/DL (ref 0–1.2)
BUN SERPL-MCNC: 16 MG/DL (ref 6–23)
CALCIUM SERPL-MCNC: 9 MG/DL (ref 8.8–10.2)
CHLORIDE SERPL-SCNC: 104 MMOL/L (ref 98–107)
CHOLEST SERPL-MCNC: 184 MG/DL (ref 0–200)
CO2 SERPL-SCNC: 24 MMOL/L (ref 20–28)
CREAT SERPL-MCNC: 0.82 MG/DL (ref 0.6–1.1)
DIFFERENTIAL METHOD BLD: ABNORMAL
EOSINOPHIL # BLD: 0.1 K/UL (ref 0–0.8)
EOSINOPHIL NFR BLD: 1 % (ref 0.5–7.8)
ERYTHROCYTE [DISTWIDTH] IN BLOOD BY AUTOMATED COUNT: 14.4 % (ref 11.9–14.6)
GLOBULIN SER CALC-MCNC: 3.1 G/DL (ref 2.3–3.5)
GLUCOSE SERPL-MCNC: 95 MG/DL (ref 70–99)
HCT VFR BLD AUTO: 43.3 % (ref 35.8–46.3)
HDLC SERPL-MCNC: 49 MG/DL (ref 40–60)
HDLC SERPL: 3.8 (ref 0–5)
HGB BLD-MCNC: 13.1 G/DL (ref 11.7–15.4)
IMM GRANULOCYTES # BLD AUTO: 0 K/UL (ref 0–0.5)
IMM GRANULOCYTES NFR BLD AUTO: 0 % (ref 0–5)
LDLC SERPL CALC-MCNC: 111 MG/DL (ref 0–100)
LYMPHOCYTES # BLD: 2.6 K/UL (ref 0.5–4.6)
LYMPHOCYTES NFR BLD: 34 % (ref 13–44)
MCH RBC QN AUTO: 28.2 PG (ref 26.1–32.9)
MCHC RBC AUTO-ENTMCNC: 30.3 G/DL (ref 31.4–35)
MCV RBC AUTO: 93.3 FL (ref 82–102)
MONOCYTES # BLD: 0.6 K/UL (ref 0.1–1.3)
MONOCYTES NFR BLD: 8 % (ref 4–12)
NEUTS SEG # BLD: 4.2 K/UL (ref 1.7–8.2)
NEUTS SEG NFR BLD: 56 % (ref 43–78)
NRBC # BLD: 0 K/UL (ref 0–0.2)
PLATELET # BLD AUTO: 278 K/UL (ref 150–450)
PMV BLD AUTO: 11 FL (ref 9.4–12.3)
POTASSIUM SERPL-SCNC: 4.5 MMOL/L (ref 3.5–5.1)
PROT SERPL-MCNC: 6.7 G/DL (ref 6.3–8.2)
RBC # BLD AUTO: 4.64 M/UL (ref 4.05–5.2)
SODIUM SERPL-SCNC: 138 MMOL/L (ref 136–145)
TRIGL SERPL-MCNC: 120 MG/DL (ref 0–150)
VLDLC SERPL CALC-MCNC: 24 MG/DL (ref 6–23)
WBC # BLD AUTO: 7.6 K/UL (ref 4.3–11.1)

## 2024-09-19 ENCOUNTER — OFFICE VISIT (OUTPATIENT)
Dept: FAMILY MEDICINE CLINIC | Facility: CLINIC | Age: 54
End: 2024-09-19
Payer: COMMERCIAL

## 2024-09-19 VITALS
HEIGHT: 61 IN | WEIGHT: 200.8 LBS | TEMPERATURE: 97.5 F | BODY MASS INDEX: 37.91 KG/M2 | RESPIRATION RATE: 16 BRPM | DIASTOLIC BLOOD PRESSURE: 85 MMHG | SYSTOLIC BLOOD PRESSURE: 124 MMHG | HEART RATE: 89 BPM | OXYGEN SATURATION: 98 %

## 2024-09-19 DIAGNOSIS — E66.9 CLASS 2 OBESITY WITHOUT SERIOUS COMORBIDITY WITH BODY MASS INDEX (BMI) OF 37.0 TO 37.9 IN ADULT, UNSPECIFIED OBESITY TYPE: ICD-10-CM

## 2024-09-19 DIAGNOSIS — N39.0 RECURRENT UTI (URINARY TRACT INFECTION): ICD-10-CM

## 2024-09-19 DIAGNOSIS — F41.9 ANXIETY: ICD-10-CM

## 2024-09-19 DIAGNOSIS — Z12.31 ENCOUNTER FOR SCREENING MAMMOGRAM FOR MALIGNANT NEOPLASM OF BREAST: ICD-10-CM

## 2024-09-19 DIAGNOSIS — Z00.00 ENCOUNTER FOR WELL ADULT EXAM WITHOUT ABNORMAL FINDINGS: Primary | ICD-10-CM

## 2024-09-19 LAB
BILIRUBIN, URINE, POC: NEGATIVE
BLOOD URINE, POC: NEGATIVE
GLUCOSE URINE, POC: NEGATIVE
KETONES, URINE, POC: NEGATIVE
LEUKOCYTE ESTERASE, URINE, POC: NORMAL
NITRITE, URINE, POC: NEGATIVE
PH, URINE, POC: 5 (ref 4.6–8)
PROTEIN,URINE, POC: NORMAL
SPECIFIC GRAVITY, URINE, POC: 1.01 (ref 1–1.03)
URINALYSIS CLARITY, POC: CLEAR
URINALYSIS COLOR, POC: YELLOW
UROBILINOGEN, POC: NORMAL

## 2024-09-19 PROCEDURE — 99396 PREV VISIT EST AGE 40-64: CPT | Performed by: PHYSICIAN ASSISTANT

## 2024-09-19 PROCEDURE — 81003 URINALYSIS AUTO W/O SCOPE: CPT | Performed by: PHYSICIAN ASSISTANT

## 2024-09-19 SDOH — ECONOMIC STABILITY: FOOD INSECURITY: WITHIN THE PAST 12 MONTHS, THE FOOD YOU BOUGHT JUST DIDN'T LAST AND YOU DIDN'T HAVE MONEY TO GET MORE.: NEVER TRUE

## 2024-09-19 SDOH — ECONOMIC STABILITY: FOOD INSECURITY: WITHIN THE PAST 12 MONTHS, YOU WORRIED THAT YOUR FOOD WOULD RUN OUT BEFORE YOU GOT MONEY TO BUY MORE.: NEVER TRUE

## 2024-09-19 SDOH — ECONOMIC STABILITY: INCOME INSECURITY: HOW HARD IS IT FOR YOU TO PAY FOR THE VERY BASICS LIKE FOOD, HOUSING, MEDICAL CARE, AND HEATING?: NOT HARD AT ALL

## 2024-09-19 ASSESSMENT — PATIENT HEALTH QUESTIONNAIRE - PHQ9
10. IF YOU CHECKED OFF ANY PROBLEMS, HOW DIFFICULT HAVE THESE PROBLEMS MADE IT FOR YOU TO DO YOUR WORK, TAKE CARE OF THINGS AT HOME, OR GET ALONG WITH OTHER PEOPLE: NOT DIFFICULT AT ALL
2. FEELING DOWN, DEPRESSED OR HOPELESS: NOT AT ALL
8. MOVING OR SPEAKING SO SLOWLY THAT OTHER PEOPLE COULD HAVE NOTICED. OR THE OPPOSITE, BEING SO FIGETY OR RESTLESS THAT YOU HAVE BEEN MOVING AROUND A LOT MORE THAN USUAL: NOT AT ALL
5. POOR APPETITE OR OVEREATING: NOT AT ALL
SUM OF ALL RESPONSES TO PHQ QUESTIONS 1-9: 4
7. TROUBLE CONCENTRATING ON THINGS, SUCH AS READING THE NEWSPAPER OR WATCHING TELEVISION: SEVERAL DAYS
3. TROUBLE FALLING OR STAYING ASLEEP: SEVERAL DAYS
6. FEELING BAD ABOUT YOURSELF - OR THAT YOU ARE A FAILURE OR HAVE LET YOURSELF OR YOUR FAMILY DOWN: NOT AT ALL
SUM OF ALL RESPONSES TO PHQ QUESTIONS 1-9: 4
SUM OF ALL RESPONSES TO PHQ9 QUESTIONS 1 & 2: 1
9. THOUGHTS THAT YOU WOULD BE BETTER OFF DEAD, OR OF HURTING YOURSELF: NOT AT ALL
SUM OF ALL RESPONSES TO PHQ QUESTIONS 1-9: 4
1. LITTLE INTEREST OR PLEASURE IN DOING THINGS: SEVERAL DAYS
4. FEELING TIRED OR HAVING LITTLE ENERGY: SEVERAL DAYS
SUM OF ALL RESPONSES TO PHQ QUESTIONS 1-9: 4

## 2024-09-19 ASSESSMENT — ENCOUNTER SYMPTOMS
COUGH: 0
VOMITING: 0
CHEST TIGHTNESS: 0
BACK PAIN: 0
RHINORRHEA: 0
SORE THROAT: 0
WHEEZING: 0
NAUSEA: 0
CONSTIPATION: 0
SHORTNESS OF BREATH: 0
BLOOD IN STOOL: 0
DIARRHEA: 0
ABDOMINAL PAIN: 0

## 2024-09-21 LAB
BACTERIA SPEC CULT: ABNORMAL
BACTERIA SPEC CULT: ABNORMAL
SERVICE CMNT-IMP: ABNORMAL

## 2024-09-23 DIAGNOSIS — N39.0 RECURRENT UTI (URINARY TRACT INFECTION): Primary | ICD-10-CM

## 2024-09-23 RX ORDER — CEPHALEXIN 500 MG/1
500 CAPSULE ORAL 2 TIMES DAILY
Qty: 14 CAPSULE | Refills: 0 | Status: SHIPPED | OUTPATIENT
Start: 2024-09-23 | End: 2024-09-30

## 2024-10-09 ENCOUNTER — OFFICE VISIT (OUTPATIENT)
Dept: FAMILY MEDICINE CLINIC | Facility: CLINIC | Age: 54
End: 2024-10-09
Payer: COMMERCIAL

## 2024-10-09 VITALS
RESPIRATION RATE: 16 BRPM | TEMPERATURE: 97.2 F | OXYGEN SATURATION: 97 % | WEIGHT: 197 LBS | SYSTOLIC BLOOD PRESSURE: 125 MMHG | HEIGHT: 61 IN | HEART RATE: 94 BPM | BODY MASS INDEX: 37.19 KG/M2 | DIASTOLIC BLOOD PRESSURE: 81 MMHG

## 2024-10-09 DIAGNOSIS — N31.9 NEUROGENIC BLADDER: ICD-10-CM

## 2024-10-09 DIAGNOSIS — F41.9 ANXIETY: Primary | ICD-10-CM

## 2024-10-09 DIAGNOSIS — N30.00 ACUTE CYSTITIS WITHOUT HEMATURIA: ICD-10-CM

## 2024-10-09 PROCEDURE — 99213 OFFICE O/P EST LOW 20 MIN: CPT | Performed by: FAMILY MEDICINE

## 2024-10-09 RX ORDER — BUPROPION HYDROCHLORIDE 300 MG/1
300 TABLET ORAL EVERY MORNING
Qty: 90 TABLET | Refills: 3 | Status: SHIPPED | OUTPATIENT
Start: 2024-10-09

## 2024-10-09 ASSESSMENT — PATIENT HEALTH QUESTIONNAIRE - PHQ9
SUM OF ALL RESPONSES TO PHQ9 QUESTIONS 1 & 2: 2
SUM OF ALL RESPONSES TO PHQ QUESTIONS 1-9: 2
1. LITTLE INTEREST OR PLEASURE IN DOING THINGS: SEVERAL DAYS
2. FEELING DOWN, DEPRESSED OR HOPELESS: SEVERAL DAYS
SUM OF ALL RESPONSES TO PHQ QUESTIONS 1-9: 2

## 2024-10-09 NOTE — PROGRESS NOTES
Ms. Hernandez  has the following allergies:   Allergies   Allergen Reactions    Amoxicillin Diarrhea       /81   Pulse 94   Temp 97.2 °F (36.2 °C)   Resp 16   Ht 1.549 m (5' 1\")   Wt 89.4 kg (197 lb)   SpO2 97%   BMI 37.22 kg/m²     HEENT: Normocephalic, atraumatic, pupils equal and reactive to light.  Neck: Supple, no masses or thyromegaly.  Lungs: clear to auscultation bilaterally.  CV: regular rate and rhythm, without murmurs, rubs, or gallops  Ext: No lower extremity edema.    Helga was seen today for urinary tract infection and medication check.    Diagnoses and all orders for this visit:    Anxiety  -     buPROPion (WELLBUTRIN XL) 300 MG extended release tablet; Take 1 tablet by mouth every morning    Acute cystitis without hematuria  -     Culture, Urine; Future  -     Culture, Urine    Neurogenic bladder    Anxiety, continue Wellbutrin  mg daily for the next several months.  If it does not seem to be working I would like to see her again.    Acute cystitis with a history of recurrent cystitis.  Will check her culture today to be sure that the infection is gone.  If it is not we will treat her with the appropriate antibiotic and refer her back to her your gynecologist because of her neurogenic bladder.    Helga Santiago MD

## 2024-10-11 LAB
BACTERIA SPEC CULT: ABNORMAL
BACTERIA SPEC CULT: ABNORMAL
SERVICE CMNT-IMP: ABNORMAL

## 2024-10-11 RX ORDER — CEFUROXIME AXETIL 500 MG/1
500 TABLET ORAL 2 TIMES DAILY
Qty: 14 TABLET | Refills: 0 | Status: SHIPPED | OUTPATIENT
Start: 2024-10-11 | End: 2024-10-18

## 2024-10-11 NOTE — RESULT ENCOUNTER NOTE
Let the patient know that her urine culture appears to be growing out some bacteria so I will send in an antibiotic such as Ceftin that she has taken before to cover for UTI.  Let us know if her symptoms are worsening

## 2024-10-14 DIAGNOSIS — N39.0 RECURRENT UTI: Primary | ICD-10-CM

## 2024-10-14 DIAGNOSIS — N31.9 NEUROGENIC BLADDER: ICD-10-CM

## 2024-11-19 ENCOUNTER — TELEPHONE (OUTPATIENT)
Dept: FAMILY MEDICINE CLINIC | Facility: CLINIC | Age: 54
End: 2024-11-19

## 2024-11-19 NOTE — TELEPHONE ENCOUNTER
Upon review of pending imaging orders, it is noted that no appointment has been scheduled for mammogram.  Called patient, she said no one has been in contact to schedule. Called  radiology scheduling, patient is scheduled for 1/9/25 @ 9:45 am at [facility]. Patient notified and stated understanding.

## 2025-01-09 ENCOUNTER — HOSPITAL ENCOUNTER (OUTPATIENT)
Dept: MAMMOGRAPHY | Age: 55
Discharge: HOME | End: 2025-01-12
Payer: COMMERCIAL

## 2025-01-09 DIAGNOSIS — Z12.31 ENCOUNTER FOR SCREENING MAMMOGRAM FOR MALIGNANT NEOPLASM OF BREAST: ICD-10-CM

## 2025-01-09 PROCEDURE — 77063 BREAST TOMOSYNTHESIS BI: CPT

## 2025-02-24 ENCOUNTER — TELEPHONE (OUTPATIENT)
Dept: FAMILY MEDICINE CLINIC | Facility: CLINIC | Age: 55
End: 2025-02-24

## 2025-02-25 NOTE — TELEPHONE ENCOUNTER
Please schedule pt for a nurse visit. Pt will need to come in 48-72 hour after placement to get read.

## (undated) DEVICE — COVER LT HNDL FOR OR SURG LAMP

## (undated) DEVICE — CATHETER URETH 16FR PVC 2 W F

## (undated) DEVICE — SOLUTION IV 1000ML 0.9% SOD CHL

## (undated) DEVICE — STER SINGLE BASIN SET W/BOWLS: Brand: CARDINAL HEALTH

## (undated) DEVICE — SUTURE PDS II SZ 3-0 L18IN ABSRB UD PS-1 L24MM 3/8 CIR REV Z683G

## (undated) DEVICE — OINTMENT BACITRACIN 1 OZ TUBE --

## (undated) DEVICE — SLIM BODY SKIN STAPLER: Brand: APPOSE ULC

## (undated) DEVICE — OCCLUSIVE GAUZE STRIP,3% BISMUTH TRIBROMOPHENATE IN PETROLATUM BLEND: Brand: XEROFORM

## (undated) DEVICE — HYPODERMIC SAFETY NEEDLE: Brand: MAGELLAN

## (undated) DEVICE — AMD ANTIMICROBIAL GAUZE SPONGES,12 PLY USP TYPE VII, 0.2% POLYHEXAMETHYLENE BIGUANIDE HCI (PHMB): Brand: CURITY

## (undated) DEVICE — SUTURE COAT VCRL SZ 4-0 L18IN ABSRB UD L19MM PS-2 1/2 CIR J496G

## (undated) DEVICE — TRAY CATH 16F DRN BG LTX -- CONVERT TO ITEM 363158

## (undated) DEVICE — SYR 10ML LUER LOK 1/5ML GRAD --

## (undated) DEVICE — BRA SURG 3XL FOR 52-55IN CHST LYCRA FR HK LOOP CLSR FOR

## (undated) DEVICE — NEEDLE HYPO 25GA L1.5IN BLU POLYPR HUB S STL REG BVL STR

## (undated) DEVICE — KENDALL SCD EXPRESS SLEEVES, KNEE LENGTH, MEDIUM: Brand: KENDALL SCD

## (undated) DEVICE — CONTAINER SPEC HISTOLOGY 900ML POLYPR

## (undated) DEVICE — UTILITY MARKER,BLACK WITH LABELS: Brand: DEVON

## (undated) DEVICE — SPONGE LAP 18X18IN STRL -- 5/PK

## (undated) DEVICE — SUTURE VCRL SZ 4-0 L27IN ABSRB UD L19MM PS-2 3/8 CIR PRIM J426H

## (undated) DEVICE — SUTURE MCRYL SZ 4-0 L27IN ABSRB UD L19MM PS-2 1/2 CIR PRIM Y426H

## (undated) DEVICE — SURGICAL PROCEDURE PACK BASIC ST FRANCIS

## (undated) DEVICE — BRA SURG POST-OP XL 46IN --

## (undated) DEVICE — SUTURE ABSRB X-1 REV CUT 1/2 CIR 22MM UD BRAID 27IN SZ 3-0 J458H

## (undated) DEVICE — GAUZE BORDERED 4X4 --

## (undated) DEVICE — BLADE ELECTRODE: Brand: VALLEYLAB

## (undated) DEVICE — GOWN,REINF,POLY,ECL,PP SLV,XL: Brand: MEDLINE

## (undated) DEVICE — SUTURE VCRL SZ 2-0 L27IN ABSRB UD L36MM CP-1 1/2 CIR REV J266H

## (undated) DEVICE — ABDOMINAL PAD: Brand: DERMACEA

## (undated) DEVICE — DRAPE TWL SURG 16X26IN BLU ORB04] ALLCARE INC]

## (undated) DEVICE — REM POLYHESIVE ADULT PATIENT RETURN ELECTRODE: Brand: VALLEYLAB

## (undated) DEVICE — JELLY LUBRICATING 10GM PREFIL SYR LUBE

## (undated) DEVICE — TRAY PREP DRY W/ PREM GLV 2 APPL 6 SPNG 2 UNDPD 1 OVERWRAP

## (undated) DEVICE — DRAPE,TOP,102X53,STERILE: Brand: MEDLINE

## (undated) DEVICE — AMD ANTIMICROBIAL BANDAGE ROLL,6 PLY: Brand: KERLIX

## (undated) DEVICE — 2000CC GUARDIAN II: Brand: GUARDIAN